# Patient Record
Sex: MALE | Race: WHITE | NOT HISPANIC OR LATINO
[De-identification: names, ages, dates, MRNs, and addresses within clinical notes are randomized per-mention and may not be internally consistent; named-entity substitution may affect disease eponyms.]

---

## 2019-06-05 PROBLEM — Z00.00 ENCOUNTER FOR PREVENTIVE HEALTH EXAMINATION: Status: ACTIVE | Noted: 2019-06-05

## 2019-06-13 ENCOUNTER — APPOINTMENT (OUTPATIENT)
Dept: SURGERY | Facility: CLINIC | Age: 79
End: 2019-06-13
Payer: MEDICARE

## 2019-06-13 VITALS
WEIGHT: 190 LBS | HEIGHT: 66 IN | BODY MASS INDEX: 30.53 KG/M2 | HEART RATE: 58 BPM | SYSTOLIC BLOOD PRESSURE: 159 MMHG | DIASTOLIC BLOOD PRESSURE: 87 MMHG

## 2019-06-13 DIAGNOSIS — Z78.9 OTHER SPECIFIED HEALTH STATUS: ICD-10-CM

## 2019-06-13 PROCEDURE — 31575 DIAGNOSTIC LARYNGOSCOPY: CPT

## 2019-06-13 PROCEDURE — 99204 OFFICE O/P NEW MOD 45 MIN: CPT

## 2019-06-13 RX ORDER — TAMSULOSIN HYDROCHLORIDE 0.4 MG/1
0.4 CAPSULE ORAL
Refills: 0 | Status: ACTIVE | COMMUNITY

## 2019-06-13 RX ORDER — LATANOPROST/PF 0.005 %
DROPS OPHTHALMIC (EYE)
Refills: 0 | Status: ACTIVE | COMMUNITY

## 2019-06-13 NOTE — HISTORY OF PRESENT ILLNESS
[de-identified] : Pt c/o left cheek mass for 6 months.   painful and bleeding.  denies increase in size,dysphagia, hoarseness or history of trauma. 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

## 2019-06-13 NOTE — PHYSICAL EXAM
[de-identified] : no palpable thyroid nodules [Nasal Endoscopy Performed] : nasal endoscopy was performed, see procedure section for findings [Laryngoscopy Performed] : laryngoscopy was performed, see procedure section for findings [de-identified] : 3 x 4 cm left hard palate lesion extending into sulcus and on to buccal mucosa with suture at lower end from recent biopsy [Midline] : located in midline position [Normal] : orientation to person, place, and time: normal [de-identified] : fiberoptic laryngoscopy shows normal vocal cord mobility bilaterally with no lesions noted

## 2019-06-13 NOTE — CONSULT LETTER
[Dear  ___] : Dear  [unfilled], [Please see my note below.] : Please see my note below. [Consult Closing:] : Thank you very much for allowing me to participate in the care of this patient.  If you have any questions, please do not hesitate to contact me. [Consult Letter:] : I had the pleasure of evaluating your patient, [unfilled]. [Sincerely,] : Sincerely, [FreeTextEntry2] : Dr. Jack Lundy, Dr. Jaime Wen [DrVu  ___] : Dr. MISHRA [FreeTextEntry3] : Selvin Christianson MD, FACS\par System Director, Endocrine Surgery\par [facility]\par

## 2019-06-16 ENCOUNTER — FORM ENCOUNTER (OUTPATIENT)
Age: 79
End: 2019-06-16

## 2019-06-17 ENCOUNTER — APPOINTMENT (OUTPATIENT)
Dept: NUCLEAR MEDICINE | Facility: IMAGING CENTER | Age: 79
End: 2019-06-17
Payer: MEDICARE

## 2019-06-17 ENCOUNTER — OUTPATIENT (OUTPATIENT)
Dept: OUTPATIENT SERVICES | Facility: HOSPITAL | Age: 79
LOS: 1 days | End: 2019-06-17
Payer: MEDICARE

## 2019-06-17 ENCOUNTER — APPOINTMENT (OUTPATIENT)
Dept: CT IMAGING | Facility: IMAGING CENTER | Age: 79
End: 2019-06-17
Payer: MEDICARE

## 2019-06-17 ENCOUNTER — TRANSCRIPTION ENCOUNTER (OUTPATIENT)
Age: 79
End: 2019-06-17

## 2019-06-17 DIAGNOSIS — C03.9 MALIGNANT NEOPLASM OF GUM, UNSPECIFIED: ICD-10-CM

## 2019-06-17 PROCEDURE — 78815 PET IMAGE W/CT SKULL-THIGH: CPT

## 2019-06-17 PROCEDURE — 78815 PET IMAGE W/CT SKULL-THIGH: CPT | Mod: 26,PI

## 2019-06-17 PROCEDURE — 70491 CT SOFT TISSUE NECK W/DYE: CPT | Mod: 26

## 2019-06-17 PROCEDURE — 82565 ASSAY OF CREATININE: CPT

## 2019-06-17 PROCEDURE — 70491 CT SOFT TISSUE NECK W/DYE: CPT

## 2019-06-17 PROCEDURE — A9552: CPT

## 2019-06-18 ENCOUNTER — OTHER (OUTPATIENT)
Age: 79
End: 2019-06-18

## 2019-06-20 ENCOUNTER — FORM ENCOUNTER (OUTPATIENT)
Age: 79
End: 2019-06-20

## 2019-06-21 ENCOUNTER — APPOINTMENT (OUTPATIENT)
Dept: ULTRASOUND IMAGING | Facility: IMAGING CENTER | Age: 79
End: 2019-06-21
Payer: MEDICARE

## 2019-06-21 ENCOUNTER — RESULT REVIEW (OUTPATIENT)
Age: 79
End: 2019-06-21

## 2019-06-21 ENCOUNTER — OUTPATIENT (OUTPATIENT)
Dept: OUTPATIENT SERVICES | Facility: HOSPITAL | Age: 79
LOS: 1 days | End: 2019-06-21
Payer: MEDICARE

## 2019-06-21 DIAGNOSIS — C03.9 MALIGNANT NEOPLASM OF GUM, UNSPECIFIED: ICD-10-CM

## 2019-06-21 PROCEDURE — 76942 ECHO GUIDE FOR BIOPSY: CPT

## 2019-06-21 PROCEDURE — 87205 SMEAR GRAM STAIN: CPT

## 2019-06-21 PROCEDURE — 88305 TISSUE EXAM BY PATHOLOGIST: CPT | Mod: 26

## 2019-06-21 PROCEDURE — 76942 ECHO GUIDE FOR BIOPSY: CPT | Mod: 26,59

## 2019-06-21 PROCEDURE — 88184 FLOWCYTOMETRY/ TC 1 MARKER: CPT

## 2019-06-21 PROCEDURE — 88185 FLOWCYTOMETRY/TC ADD-ON: CPT

## 2019-06-21 PROCEDURE — 38505 NEEDLE BIOPSY LYMPH NODES: CPT

## 2019-06-21 PROCEDURE — 88173 CYTOPATH EVAL FNA REPORT: CPT | Mod: 26

## 2019-06-21 PROCEDURE — 88172 CYTP DX EVAL FNA 1ST EA SITE: CPT

## 2019-06-21 PROCEDURE — 10005 FNA BX W/US GDN 1ST LES: CPT | Mod: 59

## 2019-06-21 PROCEDURE — 20206 BIOPSY MUSCLE PERQ NEEDLE: CPT

## 2019-06-21 PROCEDURE — 10005 FNA BX W/US GDN 1ST LES: CPT

## 2019-06-21 PROCEDURE — 10006 FNA BX W/US GDN EA ADDL: CPT

## 2019-06-21 PROCEDURE — 88173 CYTOPATH EVAL FNA REPORT: CPT

## 2019-06-21 PROCEDURE — 88188 FLOWCYTOMETRY/READ 9-15: CPT

## 2019-06-21 PROCEDURE — 88305 TISSUE EXAM BY PATHOLOGIST: CPT

## 2019-06-25 ENCOUNTER — OTHER (OUTPATIENT)
Age: 79
End: 2019-06-25

## 2019-06-25 LAB
NON-GYNECOLOGICAL CYTOLOGY STUDY: SIGNIFICANT CHANGE UP
NON-GYNECOLOGICAL CYTOLOGY STUDY: SIGNIFICANT CHANGE UP

## 2019-06-26 LAB — TM INTERPRETATION: SIGNIFICANT CHANGE UP

## 2019-06-28 LAB — NON-GYNECOLOGICAL CYTOLOGY STUDY: SIGNIFICANT CHANGE UP

## 2019-07-01 ENCOUNTER — APPOINTMENT (OUTPATIENT)
Dept: OTOLARYNGOLOGY | Facility: CLINIC | Age: 79
End: 2019-07-01
Payer: MEDICARE

## 2019-07-01 ENCOUNTER — FORM ENCOUNTER (OUTPATIENT)
Age: 79
End: 2019-07-01

## 2019-07-01 VITALS
HEART RATE: 66 BPM | HEIGHT: 66 IN | BODY MASS INDEX: 30.53 KG/M2 | SYSTOLIC BLOOD PRESSURE: 169 MMHG | WEIGHT: 190 LBS | DIASTOLIC BLOOD PRESSURE: 92 MMHG

## 2019-07-01 PROCEDURE — 31575 DIAGNOSTIC LARYNGOSCOPY: CPT

## 2019-07-01 PROCEDURE — 99205 OFFICE O/P NEW HI 60 MIN: CPT | Mod: 25

## 2019-07-01 NOTE — PROCEDURE
[FreeTextEntry6] : N [Gag Reflex] : gag reflex preventing mirror examination [None] : none [Flexible Endoscope] : examined with the flexible endoscope [Serial Number: ___] : Serial Number: [unfilled] [de-identified] : No lesions in the NPx, OPx, HPx or larynx.  VC are mobile, airway patent.\par

## 2019-07-01 NOTE — PHYSICAL EXAM
[de-identified] : L. neck level I LAD, somewhat firm, approx. 1 - 1.5 cm, no obviously significant LAD in the right neck. [Laryngoscopy Performed] : laryngoscopy was performed, see procedure section for findings [FreeTextEntry1] : Erosive mass involving the L. maxillary alveolus, palate and L. buccal mucosa.  No other lesions. [Normal] : no rashes [de-identified] : No other LAD.

## 2019-07-01 NOTE — HISTORY OF PRESENT ILLNESS
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   Pt noticed the mass about 6 months ago.  Pt had CT and PET/CT at [facility].   Pt c/o pain when eating.  Pt wears full dentures.  Pt smoked 1ppd for 40 years and quit 25 years ago.  He denies any alcohol use.  He denies any weight loss.  He underwent US FNA of right neck nodes which were all reported negative.  He denies any recent visual issues of late.

## 2019-07-01 NOTE — CONSULT LETTER
[Dear  ___] : Dear  [unfilled], [Consult Letter:] : I had the pleasure of evaluating your patient, [unfilled]. [Please see my note below.] : Please see my note below. [Consult Closing:] : Thank you very much for allowing me to participate in the care of this patient.  If you have any questions, please do not hesitate to contact me. [Sincerely,] : Sincerely, [FreeTextEntry2] : Dr. Jack Lundy\par [address], \[place] [address] [FreeTextEntry3] : Dev\par \par Ho Roca MD FACS\par Division of Head and Neck Surgery\par Department of Otolaryngology \par [facility]\par \par  of Otolaryngology\par Assistant Professor of Surgery\par [facility] [employer] [facility] [DrVu  ___] : Dr. MISHRA

## 2019-07-01 NOTE — DATA REVIEWED
[de-identified] : CT Neck and PET/CT with L. maxillary alveolus with bony erosive change and involving the maxillary sinus antrum.  There appear to be pathologic LAD bilaterally.  No evidence of distant disease. [de-identified] : Pathology from primary site with well to moderately differentiated SCCa.  Pathology from FNA of three right neck nodes all reported negative for malignancy.

## 2019-07-02 ENCOUNTER — APPOINTMENT (OUTPATIENT)
Dept: MRI IMAGING | Facility: IMAGING CENTER | Age: 79
End: 2019-07-02
Payer: MEDICARE

## 2019-07-02 ENCOUNTER — OUTPATIENT (OUTPATIENT)
Dept: OUTPATIENT SERVICES | Facility: HOSPITAL | Age: 79
LOS: 1 days | End: 2019-07-02
Payer: MEDICARE

## 2019-07-02 DIAGNOSIS — C03.0 MALIGNANT NEOPLASM OF UPPER GUM: ICD-10-CM

## 2019-07-02 PROCEDURE — A9585: CPT

## 2019-07-02 PROCEDURE — 70543 MRI ORBT/FAC/NCK W/O &W/DYE: CPT | Mod: 26

## 2019-07-02 PROCEDURE — 70543 MRI ORBT/FAC/NCK W/O &W/DYE: CPT

## 2019-07-09 ENCOUNTER — APPOINTMENT (OUTPATIENT)
Dept: OTOLARYNGOLOGY | Facility: CLINIC | Age: 79
End: 2019-07-09
Payer: MEDICARE

## 2019-07-09 ENCOUNTER — RESULT REVIEW (OUTPATIENT)
Age: 79
End: 2019-07-09

## 2019-07-09 ENCOUNTER — APPOINTMENT (OUTPATIENT)
Dept: PLASTIC SURGERY | Facility: CLINIC | Age: 79
End: 2019-07-09

## 2019-07-09 VITALS
HEART RATE: 64 BPM | HEIGHT: 66 IN | SYSTOLIC BLOOD PRESSURE: 150 MMHG | DIASTOLIC BLOOD PRESSURE: 80 MMHG | WEIGHT: 190 LBS | BODY MASS INDEX: 30.53 KG/M2

## 2019-07-09 PROCEDURE — 31231 NASAL ENDOSCOPY DX: CPT

## 2019-07-09 PROCEDURE — 99215 OFFICE O/P EST HI 40 MIN: CPT | Mod: 25

## 2019-07-09 NOTE — PHYSICAL EXAM
[Normal] : no rashes [de-identified] : L. neck level I LAD, somewhat firm, approx. 1 - 1.5 cm, no obviously significant LAD in the right neck. [FreeTextEntry1] : Erosive mass involving the L. maxillary alveolus, palate and L. buccal mucosa.  No other lesions. [de-identified] : No other LAD.

## 2019-07-09 NOTE — DATA REVIEWED
[de-identified] : MRI with large bulky mass involving the left maxillary alveolus, floor of the maxillary sinus with involvement of the posterior and lateral walls with extension into the buccal space.  The tumor extends into the retromaxillary fat.  There appears to be significant soft tissue extension within the ITF with involvement of the lateral pterygoid musculature and medial aspect of the masseter muscle.  There is also nonspecific enhancement of the L. NPx, soft palate and left tonsil.

## 2019-07-09 NOTE — CONSULT LETTER
[Courtesy Letter:] : I had the pleasure of seeing your patient, [unfilled], in my office today. [Please see my note below.] : Please see my note below. [Dear  ___] : Dear  [unfilled], [Consult Closing:] : Thank you very much for allowing me to participate in the care of this patient.  If you have any questions, please do not hesitate to contact me. [Sincerely,] : Sincerely, [FreeTextEntry2] : Dr. Jack Lundy\par [address], \[place] [address]  [FreeTextEntry3] : Dev\par \par Ho Roca MD FACS\par Division of Head and Neck Surgery\par Department of Otolaryngology \par [facility]\par \par  of Otolaryngology\par Assistant Professor of Surgery\par [facility] [employer] at [facility]\par

## 2019-07-09 NOTE — PROCEDURE
[Flexible Endoscope] : examined with the flexible endoscope [Mass] : a mass [Serial Number: ___] : Serial Number: [unfilled] [Normal] : the middle meatus had no abnormalities [FreeTextEntry6] : There is no involvement in the NPx and the soft palate appears clear as well.

## 2019-07-09 NOTE — HISTORY OF PRESENT ILLNESS
[de-identified] : Pt is here to discuss plan of care for L maxilla SCCa.  Pt c/o pain when eating. Pt wears full dentures. He denies any weight loss. He underwent US FNA of right neck nodes which were all reported negative. He denies any recent visual issues of late.  He also denies any dyspnea or otalgia.\par

## 2019-07-11 ENCOUNTER — OUTPATIENT (OUTPATIENT)
Dept: OUTPATIENT SERVICES | Facility: HOSPITAL | Age: 79
LOS: 1 days | Discharge: HOME | End: 2019-07-11
Payer: MEDICARE

## 2019-07-11 ENCOUNTER — OTHER (OUTPATIENT)
Age: 79
End: 2019-07-11

## 2019-07-11 DIAGNOSIS — S02.672A: ICD-10-CM

## 2019-07-12 ENCOUNTER — LABORATORY RESULT (OUTPATIENT)
Age: 79
End: 2019-07-12

## 2019-07-12 ENCOUNTER — RESULT REVIEW (OUTPATIENT)
Age: 79
End: 2019-07-12

## 2019-07-12 ENCOUNTER — APPOINTMENT (OUTPATIENT)
Dept: HEMATOLOGY ONCOLOGY | Facility: CLINIC | Age: 79
End: 2019-07-12
Payer: MEDICARE

## 2019-07-12 VITALS
HEART RATE: 69 BPM | HEIGHT: 65.75 IN | DIASTOLIC BLOOD PRESSURE: 83 MMHG | SYSTOLIC BLOOD PRESSURE: 149 MMHG | BODY MASS INDEX: 30.69 KG/M2 | TEMPERATURE: 98.4 F | WEIGHT: 188.71 LBS | RESPIRATION RATE: 16 BRPM | OXYGEN SATURATION: 95 %

## 2019-07-12 DIAGNOSIS — R22.0 LOCALIZED SWELLING, MASS AND LUMP, HEAD: ICD-10-CM

## 2019-07-12 DIAGNOSIS — N40.0 BENIGN PROSTATIC HYPERPLASIA WITHOUT LOWER URINARY TRACT SYMPMS: ICD-10-CM

## 2019-07-12 DIAGNOSIS — Z86.69 PERSONAL HISTORY OF OTHER DISEASES OF THE NERVOUS SYSTEM AND SENSE ORGANS: ICD-10-CM

## 2019-07-12 DIAGNOSIS — Z80.6 FAMILY HISTORY OF LEUKEMIA: ICD-10-CM

## 2019-07-12 DIAGNOSIS — Z80.42 FAMILY HISTORY OF MALIGNANT NEOPLASM OF PROSTATE: ICD-10-CM

## 2019-07-12 DIAGNOSIS — Z87.891 PERSONAL HISTORY OF NICOTINE DEPENDENCE: ICD-10-CM

## 2019-07-12 LAB
BASOPHILS # BLD AUTO: 0 K/UL — SIGNIFICANT CHANGE UP (ref 0–0.2)
BASOPHILS NFR BLD AUTO: 0.2 % — SIGNIFICANT CHANGE UP (ref 0–2)
EOSINOPHIL # BLD AUTO: 0.3 K/UL — SIGNIFICANT CHANGE UP (ref 0–0.5)
EOSINOPHIL NFR BLD AUTO: 3.3 % — SIGNIFICANT CHANGE UP (ref 0–6)
HCT VFR BLD CALC: 44.7 % — SIGNIFICANT CHANGE UP (ref 39–50)
HGB BLD-MCNC: 14.3 G/DL — SIGNIFICANT CHANGE UP (ref 13–17)
LYMPHOCYTES # BLD AUTO: 1.4 K/UL — SIGNIFICANT CHANGE UP (ref 1–3.3)
LYMPHOCYTES # BLD AUTO: 17.7 % — SIGNIFICANT CHANGE UP (ref 13–44)
MCHC RBC-ENTMCNC: 29.1 PG — SIGNIFICANT CHANGE UP (ref 27–34)
MCHC RBC-ENTMCNC: 32.1 G/DL — SIGNIFICANT CHANGE UP (ref 32–36)
MCV RBC AUTO: 90.8 FL — SIGNIFICANT CHANGE UP (ref 80–100)
MONOCYTES # BLD AUTO: 1.1 K/UL — HIGH (ref 0–0.9)
MONOCYTES NFR BLD AUTO: 13.8 % — SIGNIFICANT CHANGE UP (ref 2–14)
NEUTROPHILS # BLD AUTO: 5 K/UL — SIGNIFICANT CHANGE UP (ref 1.8–7.4)
NEUTROPHILS NFR BLD AUTO: 65 % — SIGNIFICANT CHANGE UP (ref 43–77)
PLATELET # BLD AUTO: 476 K/UL — HIGH (ref 150–400)
RBC # BLD: 4.92 M/UL — SIGNIFICANT CHANGE UP (ref 4.2–5.8)
RBC # FLD: 13.1 % — SIGNIFICANT CHANGE UP (ref 10.3–14.5)
WBC # BLD: 7.8 K/UL — SIGNIFICANT CHANGE UP (ref 3.8–10.5)
WBC # FLD AUTO: 7.8 K/UL — SIGNIFICANT CHANGE UP (ref 3.8–10.5)

## 2019-07-12 PROCEDURE — 99205 OFFICE O/P NEW HI 60 MIN: CPT

## 2019-07-12 NOTE — PHYSICAL EXAM
[Fully active, able to carry on all pre-disease performance without restriction] : Status 0 - Fully active, able to carry on all pre-disease performance without restriction [Obese] : obese [Normal] : affect appropriate [de-identified] : erosive mass in the left maxillary alveolus, palate and buccal mucosa [de-identified] : left neck node 1cm size [de-identified] : ventral hernia, liver edge palpable [de-identified] : see neck otherwise normal

## 2019-07-12 NOTE — HISTORY OF PRESENT ILLNESS
[Disease: _____________________] : Disease: [unfilled] [T: ___] : T[unfilled] [N: ___] : N[unfilled] [M: ___] : M[unfilled] [AJCC Stage: ____] : AJCC Stage: [unfilled] [de-identified] : Mr. 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 As the bump started to grow, he sought medical help and had the work up done below. \par \par PET-CT 6/17/19\par 1. Hypermetabolic enhancing left upper gingival mass corresponds to patient's known squamous cell carcinoma. Please see report of contrast-enhanced CT of same day for further description of the primary lesion. \par \par 2. Nonspecific, asymmetric hypermetabolism in left nasopharynx and left greater than right base of tongue. Please correlate with direct visualization. \par \par 3. Hypermetabolic bilateral level IB and IIA cervical lymph nodes are compatible with metastatic disease. Ultrasound-guided percutaneous needle biopsy may be obtained for confirmation. \par \par 4. Non FDG-avid right upper lobe nodular opacity is nonspecific. Correlate clinically for infection. A dedicated CT of chest is recommended in one month for further evaluation. \par \par 5. Markedly enlarged prostate gland without associated hypermetabolism.\par \par CT neck 6/17/19\par Large plaque like enhancement abnormality centered along the left maxillary gingivobuccal sulcus extending into the left maxillary sinus and toward the left infraorbital canal, and left greater palatine foramen as described. Contrast-enhanced MR imaging of the skull base can be done to evaluate for possible perineural spread of disease. \par \par Contiguous versus possible synchronous additional malignant disease seen along the left nasopharynx, extending to the uvula. Left asymmetric lingual tonsillar enhancement extending to the vallecula which may represent tonsillar hypertrophy though additional neoplastic disease is not excluded. \par \par Bilateral pathologic level I, left II, bilateral III lymphadenopathy. \par \par MRI brain 7/2/19\par A large bulky mass with associated bony destruction is again noted involving the left maxillary alveolus, floor of the left maxillary sinus, and lower aspect of the posterior lateral wall of the left maxillary sinus, with extension to the left buccal space across the gingival buccal sulcus. The tumor extends into the lower retromaxillary fat. Tumor abuts and may involve the base of the pterygoid plates and greater-lesser palatine foramina, however the pterygopalatine fossa, infraorbital canal, Meckel's caves, and cavernous sinuses appear grossly spared. A component of the tumor may spread submucosally underneath the left hard palate. \par \par Nonspecific asymmetric enhancement involving the left nasopharynx, left soft palate, uvula, and left tonsil appears stable compared to the prior CT. Correlate with direct visualization findings to exclude mucosal or submucosal spread of tumor versus lymphoid hypertrophy. \par \par Heterogeneous bilateral level 1 and level 2 lymph nodes are again noted suspicious for pathologic lymphadenopathy, stable in size compared to the CT examination. \par \par Pathology 7/9/19- invasive squamous cell carcinoma, well to moderately differentiated\par \par FNA cervical node right 6/25/19- negative for malignancy on level 1B, 2 and 3 [de-identified] : SCC [de-identified] : Patient lost around 12lb due to anorexia over the past couple months. Some pain on left maxilla, minimal pain, does not take any pain medication, sharp, does not radiate. Denies SOB, chest pain, palpitation. Some constipation. Denies epistaxis. Chronic hearing problems- hearing aids- due to otosclerosis. Edentulous. Denies neuropathy, urinary issues, leg edema, headaches. All other ROS negative.

## 2019-07-12 NOTE — REASON FOR VISIT
[Initial Consultation] : an initial consultation [Spouse] : spouse [FreeTextEntry2] : maxillary sinus cancer

## 2019-07-12 NOTE — ASSESSMENT
[FreeTextEntry1] : Mr. 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 \par \par I had a lengthy discussion with the patient about his diagnosis, prognosis, stage and treatment options. He had time to answer questions and all the questions were answered to his satisfaction. \par \par Due to the extent of the disease, case was discussed at tumor board and consensus was to start induction chemo/immunotherapy to assess for response of disease prior to considering surgery and/or radiation vs systemic palliation only. \par \par Labs on his way out. Return to clinic in one week.\par \par \par Cassia Campbell MD\par , [facility]\par [facility]\par [address]\[place], [address]\par Tel: [phone]\par Fax: [phone]\par \par

## 2019-07-19 ENCOUNTER — APPOINTMENT (OUTPATIENT)
Dept: HEMATOLOGY ONCOLOGY | Facility: CLINIC | Age: 79
End: 2019-07-19
Payer: MEDICARE

## 2019-07-19 ENCOUNTER — APPOINTMENT (OUTPATIENT)
Dept: INFUSION THERAPY | Facility: HOSPITAL | Age: 79
End: 2019-07-19

## 2019-07-19 VITALS
RESPIRATION RATE: 16 BRPM | TEMPERATURE: 98.5 F | BODY MASS INDEX: 30.26 KG/M2 | SYSTOLIC BLOOD PRESSURE: 171 MMHG | WEIGHT: 186.07 LBS | DIASTOLIC BLOOD PRESSURE: 81 MMHG | OXYGEN SATURATION: 97 % | HEART RATE: 60 BPM

## 2019-07-19 PROCEDURE — 99214 OFFICE O/P EST MOD 30 MIN: CPT

## 2019-07-20 NOTE — PHYSICAL EXAM
[Fully active, able to carry on all pre-disease performance without restriction] : Status 0 - Fully active, able to carry on all pre-disease performance without restriction [Obese] : obese [Normal] : affect appropriate [de-identified] : erosive mass in the left maxillary alveolus, palate and buccal mucosa [de-identified] : left neck node 1cm size [de-identified] : ventral hernia, liver edge palpable [de-identified] : see neck otherwise normal

## 2019-07-20 NOTE — HISTORY OF PRESENT ILLNESS
[Disease: _____________________] : Disease: [unfilled] [T: ___] : T[unfilled] [N: ___] : N[unfilled] [M: ___] : M[unfilled] [AJCC Stage: ____] : AJCC Stage: [unfilled] [de-identified] : Mr. 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 As the bump started to grow, he sought medical help and had the work up done below. \par \par PET-CT 6/17/19\par 1. Hypermetabolic enhancing left upper gingival mass corresponds to patient's known squamous cell carcinoma. Please see report of contrast-enhanced CT of same day for further description of the primary lesion. \par \par 2. Nonspecific, asymmetric hypermetabolism in left nasopharynx and left greater than right base of tongue. Please correlate with direct visualization. \par \par 3. Hypermetabolic bilateral level IB and IIA cervical lymph nodes are compatible with metastatic disease. Ultrasound-guided percutaneous needle biopsy may be obtained for confirmation. \par \par 4. Non FDG-avid right upper lobe nodular opacity is nonspecific. Correlate clinically for infection. A dedicated CT of chest is recommended in one month for further evaluation. \par \par 5. Markedly enlarged prostate gland without associated hypermetabolism.\par \par CT neck 6/17/19\par Large plaque like enhancement abnormality centered along the left maxillary gingivobuccal sulcus extending into the left maxillary sinus and toward the left infraorbital canal, and left greater palatine foramen as described. Contrast-enhanced MR imaging of the skull base can be done to evaluate for possible perineural spread of disease. \par \par Contiguous versus possible synchronous additional malignant disease seen along the left nasopharynx, extending to the uvula. Left asymmetric lingual tonsillar enhancement extending to the vallecula which may represent tonsillar hypertrophy though additional neoplastic disease is not excluded. \par \par Bilateral pathologic level I, left II, bilateral III lymphadenopathy. \par \par MRI brain 7/2/19\par A large bulky mass with associated bony destruction is again noted involving the left maxillary alveolus, floor of the left maxillary sinus, and lower aspect of the posterior lateral wall of the left maxillary sinus, with extension to the left buccal space across the gingival buccal sulcus. The tumor extends into the lower retromaxillary fat. Tumor abuts and may involve the base of the pterygoid plates and greater-lesser palatine foramina, however the pterygopalatine fossa, infraorbital canal, Meckel's caves, and cavernous sinuses appear grossly spared. A component of the tumor may spread submucosally underneath the left hard palate. \par \par Nonspecific asymmetric enhancement involving the left nasopharynx, left soft palate, uvula, and left tonsil appears stable compared to the prior CT. Correlate with direct visualization findings to exclude mucosal or submucosal spread of tumor versus lymphoid hypertrophy. \par \par Heterogeneous bilateral level 1 and level 2 lymph nodes are again noted suspicious for pathologic lymphadenopathy, stable in size compared to the CT examination. 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 Bromberg is doing well. Some pain on left maxilla, minimal pain, does not take any pain medication, sharp, does not radiate. Denies SOB, chest pain, palpitation. Some constipation. Denies epistaxis. Chronic hearing problems- hearing aids- due to otosclerosis. Edentulous. Denies neuropathy, urinary issues, leg edema, headaches. All other ROS negative.

## 2019-07-20 NOTE — ASSESSMENT
[FreeTextEntry1] : Mr. 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 \par \par I had a lengthy discussion with the patient about his diagnosis, prognosis, stage and treatment options. He had time to answer questions and all the questions were answered to his satisfaction. \par \par Due to the extent of the disease, case was discussed at tumor board and consensus was to start induction chemo/immunotherapy to assess for response of disease prior to considering surgery and/or radiation vs systemic palliation only. \par \par We discussed with the patient about immunotherapy, explaining the mechanism of actions of immunotherapy agents, potential side effects which include but are not limited to auto-immune processes, such as pneumonitis, skin rashes, colitis, hepatitis, myositis, endocrine effects.\par \par Today I met with our patient regarding treatment with systemic therapy with carbo/taxol and pembro.  During the visit, we explained in detail the rationale for both this treatment as well as other standard treatment and palliative options if any available.  We presented and discussed in detail the potential side effects of the medication(s) and procedures. We had given our patient the informed consent form for their review, and all questions about the treatment and potential side effects were discussed and questions answered by me to the patient's apparent satisfaction.\par \par After weighing the risks and benefits, our patient expressed the agreement to receive therapy as indicated above.  The patient signed and dated a copy of the informed consent with a witness as indicated. We gave our patient a copy of the signed informed consent. We placed a copy of the consent form in the [facility] for our record keeping. \par \par \par Tentatively cycle #1 is 7/23/19\par \par Cassia Campbell MD\par , [facility]\par [facility]\par [address]\[place], [address]\par Tel: [phone]\par Fax: [phone]\par \par

## 2019-07-23 ENCOUNTER — RESULT REVIEW (OUTPATIENT)
Age: 79
End: 2019-07-23

## 2019-07-23 ENCOUNTER — OTHER (OUTPATIENT)
Age: 79
End: 2019-07-23

## 2019-07-23 ENCOUNTER — APPOINTMENT (OUTPATIENT)
Dept: INFUSION THERAPY | Facility: HOSPITAL | Age: 79
End: 2019-07-23

## 2019-07-23 LAB
BASOPHILS # BLD AUTO: 0 K/UL — SIGNIFICANT CHANGE UP (ref 0–0.2)
BASOPHILS NFR BLD AUTO: 0.4 % — SIGNIFICANT CHANGE UP (ref 0–2)
BUN SERPL-MCNC: 15 MG/DL — SIGNIFICANT CHANGE UP (ref 7–23)
CA-I BLDA-SCNC: 1.21 MMOL/L — SIGNIFICANT CHANGE UP (ref 1.12–1.3)
CHLORIDE SERPL-SCNC: 103 MMOL/L — SIGNIFICANT CHANGE UP (ref 96–108)
CO2 SERPL-SCNC: 26 MMOL/L — SIGNIFICANT CHANGE UP (ref 22–31)
CREAT SERPL-MCNC: 1.2 MG/DL — SIGNIFICANT CHANGE UP (ref 0.5–1.3)
EOSINOPHIL # BLD AUTO: 0.3 K/UL — SIGNIFICANT CHANGE UP (ref 0–0.5)
EOSINOPHIL NFR BLD AUTO: 3.4 % — SIGNIFICANT CHANGE UP (ref 0–6)
GLUCOSE SERPL-MCNC: 109 MG/DL — HIGH (ref 70–99)
HCT VFR BLD CALC: 42.1 % — SIGNIFICANT CHANGE UP (ref 39–50)
HGB BLD-MCNC: 14.2 G/DL — SIGNIFICANT CHANGE UP (ref 13–17)
LYMPHOCYTES # BLD AUTO: 1.6 K/UL — SIGNIFICANT CHANGE UP (ref 1–3.3)
LYMPHOCYTES # BLD AUTO: 15.8 % — SIGNIFICANT CHANGE UP (ref 13–44)
MCHC RBC-ENTMCNC: 30.2 PG — SIGNIFICANT CHANGE UP (ref 27–34)
MCHC RBC-ENTMCNC: 33.7 G/DL — SIGNIFICANT CHANGE UP (ref 32–36)
MCV RBC AUTO: 89.5 FL — SIGNIFICANT CHANGE UP (ref 80–100)
MONOCYTES # BLD AUTO: 1.2 K/UL — HIGH (ref 0–0.9)
MONOCYTES NFR BLD AUTO: 11.5 % — SIGNIFICANT CHANGE UP (ref 2–14)
NEUTROPHILS # BLD AUTO: 6.9 K/UL — SIGNIFICANT CHANGE UP (ref 1.8–7.4)
NEUTROPHILS NFR BLD AUTO: 68.9 % — SIGNIFICANT CHANGE UP (ref 43–77)
PLATELET # BLD AUTO: 411 K/UL — HIGH (ref 150–400)
POTASSIUM SERPL-MCNC: 4.2 MMOL/L — SIGNIFICANT CHANGE UP (ref 3.5–5.3)
POTASSIUM SERPL-SCNC: 4.2 MMOL/L — SIGNIFICANT CHANGE UP (ref 3.5–5.3)
RBC # BLD: 4.7 M/UL — SIGNIFICANT CHANGE UP (ref 4.2–5.8)
RBC # FLD: 11.9 % — SIGNIFICANT CHANGE UP (ref 10.3–14.5)
SODIUM SERPL-SCNC: 139 MMOL/L — SIGNIFICANT CHANGE UP (ref 135–145)
WBC # BLD: 10 K/UL — SIGNIFICANT CHANGE UP (ref 3.8–10.5)
WBC # FLD AUTO: 10 K/UL — SIGNIFICANT CHANGE UP (ref 3.8–10.5)

## 2019-07-23 PROCEDURE — 93010 ELECTROCARDIOGRAM REPORT: CPT

## 2019-07-24 ENCOUNTER — OTHER (OUTPATIENT)
Age: 79
End: 2019-07-24

## 2019-07-24 DIAGNOSIS — R11.2 NAUSEA WITH VOMITING, UNSPECIFIED: ICD-10-CM

## 2019-07-24 DIAGNOSIS — Z51.11 ENCOUNTER FOR ANTINEOPLASTIC CHEMOTHERAPY: ICD-10-CM

## 2019-07-24 DIAGNOSIS — C76.0 MALIGNANT NEOPLASM OF HEAD, FACE AND NECK: ICD-10-CM

## 2019-07-24 DIAGNOSIS — C06.9 MALIGNANT NEOPLASM OF MOUTH, UNSPECIFIED: ICD-10-CM

## 2019-07-26 ENCOUNTER — MOBILE ON CALL (OUTPATIENT)
Age: 79
End: 2019-07-26

## 2019-07-29 ENCOUNTER — MESSAGE (OUTPATIENT)
Age: 79
End: 2019-07-29

## 2019-08-06 ENCOUNTER — OTHER (OUTPATIENT)
Age: 79
End: 2019-08-06

## 2019-08-08 ENCOUNTER — OUTPATIENT (OUTPATIENT)
Dept: OUTPATIENT SERVICES | Facility: HOSPITAL | Age: 79
LOS: 1 days | Discharge: HOME | End: 2019-08-08

## 2019-08-08 DIAGNOSIS — C76.0 MALIGNANT NEOPLASM OF HEAD, FACE AND NECK: ICD-10-CM

## 2019-08-08 DIAGNOSIS — C06.9 MALIGNANT NEOPLASM OF MOUTH, UNSPECIFIED: ICD-10-CM

## 2019-08-08 PROBLEM — C31.0 MALIGNANT NEOPLASM OF MAXILLARY SINUS: Chronic | Status: ACTIVE | Noted: 2019-07-22

## 2019-08-08 PROBLEM — H80.90 UNSPECIFIED OTOSCLEROSIS, UNSPECIFIED EAR: Chronic | Status: ACTIVE | Noted: 2019-07-22

## 2019-08-08 PROBLEM — R63.0 ANOREXIA: Chronic | Status: ACTIVE | Noted: 2019-07-22

## 2019-08-12 ENCOUNTER — APPOINTMENT (OUTPATIENT)
Dept: HEMATOLOGY ONCOLOGY | Facility: CLINIC | Age: 79
End: 2019-08-12

## 2019-08-12 ENCOUNTER — RESULT REVIEW (OUTPATIENT)
Age: 79
End: 2019-08-12

## 2019-08-12 ENCOUNTER — LABORATORY RESULT (OUTPATIENT)
Age: 79
End: 2019-08-12

## 2019-08-12 LAB
ALBUMIN SERPL ELPH-MCNC: 4.4 G/DL
ALP BLD-CCNC: 72 U/L
ALT SERPL-CCNC: 23 U/L
ANION GAP SERPL CALC-SCNC: 14 MMOL/L
AST SERPL-CCNC: 20 U/L
BASOPHILS # BLD AUTO: 0 K/UL — SIGNIFICANT CHANGE UP (ref 0–0.2)
BASOPHILS NFR BLD AUTO: 0.6 % — SIGNIFICANT CHANGE UP (ref 0–2)
BILIRUB SERPL-MCNC: 0.2 MG/DL
BUN SERPL-MCNC: 17 MG/DL
CALCIUM SERPL-MCNC: 9.8 MG/DL
CHLORIDE SERPL-SCNC: 101 MMOL/L
CO2 SERPL-SCNC: 26 MMOL/L
CREAT SERPL-MCNC: 0.97 MG/DL
EOSINOPHIL # BLD AUTO: 0.1 K/UL — SIGNIFICANT CHANGE UP (ref 0–0.5)
EOSINOPHIL NFR BLD AUTO: 1.6 % — SIGNIFICANT CHANGE UP (ref 0–6)
GLUCOSE SERPL-MCNC: 133 MG/DL
HCT VFR BLD CALC: 43.6 % — SIGNIFICANT CHANGE UP (ref 39–50)
HGB BLD-MCNC: 13.9 G/DL — SIGNIFICANT CHANGE UP (ref 13–17)
LYMPHOCYTES # BLD AUTO: 1.3 K/UL — SIGNIFICANT CHANGE UP (ref 1–3.3)
LYMPHOCYTES # BLD AUTO: 19 % — SIGNIFICANT CHANGE UP (ref 13–44)
MCHC RBC-ENTMCNC: 29 PG — SIGNIFICANT CHANGE UP (ref 27–34)
MCHC RBC-ENTMCNC: 31.8 G/DL — LOW (ref 32–36)
MCV RBC AUTO: 91 FL — SIGNIFICANT CHANGE UP (ref 80–100)
MONOCYTES # BLD AUTO: 0.9 K/UL — SIGNIFICANT CHANGE UP (ref 0–0.9)
MONOCYTES NFR BLD AUTO: 13.8 % — SIGNIFICANT CHANGE UP (ref 2–14)
NEUTROPHILS # BLD AUTO: 4.4 K/UL — SIGNIFICANT CHANGE UP (ref 1.8–7.4)
NEUTROPHILS NFR BLD AUTO: 64.9 % — SIGNIFICANT CHANGE UP (ref 43–77)
PLATELET # BLD AUTO: 412 K/UL — HIGH (ref 150–400)
POTASSIUM SERPL-SCNC: 4.6 MMOL/L
PROT SERPL-MCNC: 7.7 G/DL
RBC # BLD: 4.79 M/UL — SIGNIFICANT CHANGE UP (ref 4.2–5.8)
RBC # FLD: 13.3 % — SIGNIFICANT CHANGE UP (ref 10.3–14.5)
SODIUM SERPL-SCNC: 141 MMOL/L
TSH SERPL-ACNC: 0.21 UIU/ML
WBC # BLD: 6.7 K/UL — SIGNIFICANT CHANGE UP (ref 3.8–10.5)
WBC # FLD AUTO: 6.7 K/UL — SIGNIFICANT CHANGE UP (ref 3.8–10.5)

## 2019-08-13 ENCOUNTER — APPOINTMENT (OUTPATIENT)
Dept: INFUSION THERAPY | Facility: HOSPITAL | Age: 79
End: 2019-08-13

## 2019-08-13 ENCOUNTER — APPOINTMENT (OUTPATIENT)
Dept: HEMATOLOGY ONCOLOGY | Facility: CLINIC | Age: 79
End: 2019-08-13
Payer: MEDICARE

## 2019-08-13 VITALS
OXYGEN SATURATION: 96 % | RESPIRATION RATE: 16 BRPM | BODY MASS INDEX: 30.47 KG/M2 | DIASTOLIC BLOOD PRESSURE: 84 MMHG | WEIGHT: 187.39 LBS | HEART RATE: 73 BPM | TEMPERATURE: 98.8 F | SYSTOLIC BLOOD PRESSURE: 160 MMHG

## 2019-08-13 DIAGNOSIS — Z87.898 PERSONAL HISTORY OF OTHER SPECIFIED CONDITIONS: ICD-10-CM

## 2019-08-13 DIAGNOSIS — R79.89 OTHER SPECIFIED ABNORMAL FINDINGS OF BLOOD CHEMISTRY: ICD-10-CM

## 2019-08-13 PROCEDURE — 99214 OFFICE O/P EST MOD 30 MIN: CPT

## 2019-08-13 RX ORDER — PROCHLORPERAZINE MALEATE 10 MG/1
10 TABLET ORAL
Qty: 30 | Refills: 1 | Status: DISCONTINUED | COMMUNITY
Start: 2019-07-20 | End: 2019-08-13

## 2019-08-13 RX ORDER — LEVOTHYROXINE SODIUM 0.1 MG/1
100 TABLET ORAL DAILY
Qty: 30 | Refills: 3 | Status: DISCONTINUED | COMMUNITY
Start: 2019-07-19 | End: 2019-08-13

## 2019-08-13 NOTE — ASSESSMENT
[FreeTextEntry1] : Mr. GORDON BROMBERG is a pleasant 79 year old man who comes here today prior to cycle #2 of pembro/carbo/taxol to treat  his diagnosis of maxillary sinus carcinoma. Significant decrease in the size of tumor after 1 cycle of therapy. \par \par Renewed labs- proceed with cycle #2 today, 8/13/19. \par \par Hypothyroidism- checked TSH- decrease levothyroxine to 75 mcg/day. \par \par Hiccups- refilled Reglan if that happens again. \par \par Pruritus- due to pembro. Counselled. Instructed to let us know if it happens again. \par \par Return to clinic in 3 weeks. \par \par Cassia Campbell MD\par , [facility]\par [facility]\par [address]\[place], [address]\par Tel: [phone]\par Fax: [phone]\par \par

## 2019-08-13 NOTE — HISTORY OF PRESENT ILLNESS
[Disease: _____________________] : Disease: [unfilled] [T: ___] : T[unfilled] [N: ___] : N[unfilled] [M: ___] : M[unfilled] [AJCC Stage: ____] : AJCC Stage: [unfilled] [de-identified] : Mr. 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 As the bump started to grow, he sought medical help and had the work up done below. \par \par PET-CT 6/17/19\par 1. Hypermetabolic enhancing left upper gingival mass corresponds to patient's known squamous cell carcinoma. Please see report of contrast-enhanced CT of same day for further description of the primary lesion. \par \par 2. Nonspecific, asymmetric hypermetabolism in left nasopharynx and left greater than right base of tongue. Please correlate with direct visualization. \par \par 3. Hypermetabolic bilateral level IB and IIA cervical lymph nodes are compatible with metastatic disease. Ultrasound-guided percutaneous needle biopsy may be obtained for confirmation. \par \par 4. Non FDG-avid right upper lobe nodular opacity is nonspecific. Correlate clinically for infection. A dedicated CT of chest is recommended in one month for further evaluation. \par \par 5. Markedly enlarged prostate gland without associated hypermetabolism.\par \par CT neck 6/17/19\par Large plaque like enhancement abnormality centered along the left maxillary gingivobuccal sulcus extending into the left maxillary sinus and toward the left infraorbital canal, and left greater palatine foramen as described. Contrast-enhanced MR imaging of the skull base can be done to evaluate for possible perineural spread of disease. \par \par Contiguous versus possible synchronous additional malignant disease seen along the left nasopharynx, extending to the uvula. Left asymmetric lingual tonsillar enhancement extending to the vallecula which may represent tonsillar hypertrophy though additional neoplastic disease is not excluded. \par \par Bilateral pathologic level I, left II, bilateral III lymphadenopathy. \par \par MRI brain 7/2/19\par A large bulky mass with associated bony destruction is again noted involving the left maxillary alveolus, floor of the left maxillary sinus, and lower aspect of the posterior lateral wall of the left maxillary sinus, with extension to the left buccal space across the gingival buccal sulcus. The tumor extends into the lower retromaxillary fat. Tumor abuts and may involve the base of the pterygoid plates and greater-lesser palatine foramina, however the pterygopalatine fossa, infraorbital canal, Meckel's caves, and cavernous sinuses appear grossly spared. A component of the tumor may spread submucosally underneath the left hard palate. \par \par Nonspecific asymmetric enhancement involving the left nasopharynx, left soft palate, uvula, and left tonsil appears stable compared to the prior CT. Correlate with direct visualization findings to exclude mucosal or submucosal spread of tumor versus lymphoid hypertrophy. \par \par Heterogeneous bilateral level 1 and level 2 lymph nodes are again noted suspicious for pathologic lymphadenopathy, stable in size compared to the CT examination. 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 Bromberg experienced hiccups after first cycle of chemo for 3 days. Hiccups improved with reglan. He also had some mild itching, no rash, now resolved. Some constipation, resolved with senna/colace. No pain. Increase appetite, gained weight. Denies SOB, chest pain, palpitation. Denies epistaxis. Chronic hearing problems- hearing aids- due to otosclerosis. Edentulous. Denies neuropathy, urinary issues, leg edema, headaches. All other ROS negative.

## 2019-08-13 NOTE — PHYSICAL EXAM
[Fully active, able to carry on all pre-disease performance without restriction] : Status 0 - Fully active, able to carry on all pre-disease performance without restriction [Obese] : obese [Normal] : no peripheral adenopathy appreciated [de-identified] : erosive mass in the left maxillary alveolus is barely seen on today's examination [de-identified] : ventral hernia, liver edge palpable

## 2019-08-14 ENCOUNTER — RX CHANGE (OUTPATIENT)
Age: 79
End: 2019-08-14

## 2019-08-14 DIAGNOSIS — R11.2 NAUSEA WITH VOMITING, UNSPECIFIED: ICD-10-CM

## 2019-08-14 DIAGNOSIS — Z51.11 ENCOUNTER FOR ANTINEOPLASTIC CHEMOTHERAPY: ICD-10-CM

## 2019-08-16 ENCOUNTER — EMERGENCY (EMERGENCY)
Facility: HOSPITAL | Age: 79
LOS: 1 days | Discharge: HOME | End: 2019-08-16
Attending: EMERGENCY MEDICINE | Admitting: EMERGENCY MEDICINE
Payer: MEDICARE

## 2019-08-16 VITALS
OXYGEN SATURATION: 97 % | SYSTOLIC BLOOD PRESSURE: 176 MMHG | RESPIRATION RATE: 18 BRPM | HEART RATE: 67 BPM | DIASTOLIC BLOOD PRESSURE: 88 MMHG | TEMPERATURE: 98 F

## 2019-08-16 VITALS
HEART RATE: 83 BPM | DIASTOLIC BLOOD PRESSURE: 91 MMHG | TEMPERATURE: 98 F | RESPIRATION RATE: 16 BRPM | SYSTOLIC BLOOD PRESSURE: 173 MMHG

## 2019-08-16 LAB
ALBUMIN SERPL ELPH-MCNC: 4.6 G/DL — SIGNIFICANT CHANGE UP (ref 3.3–5)
ALP SERPL-CCNC: 64 U/L — SIGNIFICANT CHANGE UP (ref 40–120)
ALT FLD-CCNC: 24 U/L — SIGNIFICANT CHANGE UP (ref 4–41)
ANION GAP SERPL CALC-SCNC: 12 MMO/L — SIGNIFICANT CHANGE UP (ref 7–14)
APPEARANCE UR: CLEAR — SIGNIFICANT CHANGE UP
AST SERPL-CCNC: 27 U/L — SIGNIFICANT CHANGE UP (ref 4–40)
BACTERIA # UR AUTO: NEGATIVE — SIGNIFICANT CHANGE UP
BASOPHILS # BLD AUTO: 0.01 K/UL — SIGNIFICANT CHANGE UP (ref 0–0.2)
BASOPHILS NFR BLD AUTO: 0.1 % — SIGNIFICANT CHANGE UP (ref 0–2)
BILIRUB SERPL-MCNC: 0.8 MG/DL — SIGNIFICANT CHANGE UP (ref 0.2–1.2)
BILIRUB UR-MCNC: NEGATIVE — SIGNIFICANT CHANGE UP
BLOOD UR QL VISUAL: NEGATIVE — SIGNIFICANT CHANGE UP
BUN SERPL-MCNC: 21 MG/DL — SIGNIFICANT CHANGE UP (ref 7–23)
CALCIUM SERPL-MCNC: 9.7 MG/DL — SIGNIFICANT CHANGE UP (ref 8.4–10.5)
CHLORIDE SERPL-SCNC: 98 MMOL/L — SIGNIFICANT CHANGE UP (ref 98–107)
CO2 SERPL-SCNC: 26 MMOL/L — SIGNIFICANT CHANGE UP (ref 22–31)
COLOR SPEC: SIGNIFICANT CHANGE UP
CREAT SERPL-MCNC: 0.85 MG/DL — SIGNIFICANT CHANGE UP (ref 0.5–1.3)
EOSINOPHIL # BLD AUTO: 0.11 K/UL — SIGNIFICANT CHANGE UP (ref 0–0.5)
EOSINOPHIL NFR BLD AUTO: 1.5 % — SIGNIFICANT CHANGE UP (ref 0–6)
GLUCOSE SERPL-MCNC: 106 MG/DL — HIGH (ref 70–99)
GLUCOSE UR-MCNC: NEGATIVE — SIGNIFICANT CHANGE UP
HCT VFR BLD CALC: 44.7 % — SIGNIFICANT CHANGE UP (ref 39–50)
HGB BLD-MCNC: 14.2 G/DL — SIGNIFICANT CHANGE UP (ref 13–17)
HYALINE CASTS # UR AUTO: NEGATIVE — SIGNIFICANT CHANGE UP
IMM GRANULOCYTES NFR BLD AUTO: 0.4 % — SIGNIFICANT CHANGE UP (ref 0–1.5)
KETONES UR-MCNC: NEGATIVE — SIGNIFICANT CHANGE UP
LEUKOCYTE ESTERASE UR-ACNC: NEGATIVE — SIGNIFICANT CHANGE UP
LYMPHOCYTES # BLD AUTO: 0.82 K/UL — LOW (ref 1–3.3)
LYMPHOCYTES # BLD AUTO: 10.8 % — LOW (ref 13–44)
MCHC RBC-ENTMCNC: 28.6 PG — SIGNIFICANT CHANGE UP (ref 27–34)
MCHC RBC-ENTMCNC: 31.8 % — LOW (ref 32–36)
MCV RBC AUTO: 89.9 FL — SIGNIFICANT CHANGE UP (ref 80–100)
MONOCYTES # BLD AUTO: 0.38 K/UL — SIGNIFICANT CHANGE UP (ref 0–0.9)
MONOCYTES NFR BLD AUTO: 5 % — SIGNIFICANT CHANGE UP (ref 2–14)
NEUTROPHILS # BLD AUTO: 6.21 K/UL — SIGNIFICANT CHANGE UP (ref 1.8–7.4)
NEUTROPHILS NFR BLD AUTO: 82.2 % — HIGH (ref 43–77)
NITRITE UR-MCNC: NEGATIVE — SIGNIFICANT CHANGE UP
NRBC # FLD: 0 K/UL — SIGNIFICANT CHANGE UP (ref 0–0)
PH UR: 7.5 — SIGNIFICANT CHANGE UP (ref 5–8)
PLATELET # BLD AUTO: 281 K/UL — SIGNIFICANT CHANGE UP (ref 150–400)
PMV BLD: 8.5 FL — SIGNIFICANT CHANGE UP (ref 7–13)
POTASSIUM SERPL-MCNC: 4 MMOL/L — SIGNIFICANT CHANGE UP (ref 3.5–5.3)
POTASSIUM SERPL-SCNC: 4 MMOL/L — SIGNIFICANT CHANGE UP (ref 3.5–5.3)
PROT SERPL-MCNC: 8.3 G/DL — SIGNIFICANT CHANGE UP (ref 6–8.3)
PROT UR-MCNC: 30 — SIGNIFICANT CHANGE UP
RBC # BLD: 4.97 M/UL — SIGNIFICANT CHANGE UP (ref 4.2–5.8)
RBC # FLD: 13.4 % — SIGNIFICANT CHANGE UP (ref 10.3–14.5)
RBC CASTS # UR COMP ASSIST: SIGNIFICANT CHANGE UP (ref 0–?)
SODIUM SERPL-SCNC: 136 MMOL/L — SIGNIFICANT CHANGE UP (ref 135–145)
SP GR SPEC: 1.02 — SIGNIFICANT CHANGE UP (ref 1–1.04)
SQUAMOUS # UR AUTO: SIGNIFICANT CHANGE UP
UROBILINOGEN FLD QL: NORMAL — SIGNIFICANT CHANGE UP
WBC # BLD: 7.56 K/UL — SIGNIFICANT CHANGE UP (ref 3.8–10.5)
WBC # FLD AUTO: 7.56 K/UL — SIGNIFICANT CHANGE UP (ref 3.8–10.5)
WBC UR QL: SIGNIFICANT CHANGE UP (ref 0–?)

## 2019-08-16 PROCEDURE — 70450 CT HEAD/BRAIN W/O DYE: CPT | Mod: 26

## 2019-08-16 PROCEDURE — 71045 X-RAY EXAM CHEST 1 VIEW: CPT | Mod: 26

## 2019-08-16 PROCEDURE — 93010 ELECTROCARDIOGRAM REPORT: CPT

## 2019-08-16 PROCEDURE — 99284 EMERGENCY DEPT VISIT MOD MDM: CPT | Mod: 25

## 2019-08-16 RX ORDER — METOCLOPRAMIDE HCL 10 MG
10 TABLET ORAL ONCE
Refills: 0 | Status: COMPLETED | OUTPATIENT
Start: 2019-08-16 | End: 2019-08-16

## 2019-08-16 RX ORDER — SODIUM CHLORIDE 9 MG/ML
1000 INJECTION INTRAMUSCULAR; INTRAVENOUS; SUBCUTANEOUS ONCE
Refills: 0 | Status: COMPLETED | OUTPATIENT
Start: 2019-08-16 | End: 2019-08-16

## 2019-08-16 RX ORDER — ACETAMINOPHEN 500 MG
650 TABLET ORAL ONCE
Refills: 0 | Status: COMPLETED | OUTPATIENT
Start: 2019-08-16 | End: 2019-08-16

## 2019-08-16 RX ADMIN — Medication 10 MILLIGRAM(S): at 20:42

## 2019-08-16 RX ADMIN — Medication 650 MILLIGRAM(S): at 18:19

## 2019-08-16 RX ADMIN — SODIUM CHLORIDE 1000 MILLILITER(S): 9 INJECTION INTRAMUSCULAR; INTRAVENOUS; SUBCUTANEOUS at 18:20

## 2019-08-16 NOTE — ED PROVIDER NOTE - CLINICAL SUMMARY MEDICAL DECISION MAKING FREE TEXT BOX
80 y/o male s/p chemo for maxillary CA 3 days ago presents w/new onset HA and palpitations today. Denies CP, SOB, cough, dysuria. Will obtain CBC, CMP, UA, CXR, head CT and reassess

## 2019-08-16 NOTE — ED PROVIDER NOTE - NS ED ROS FT
General: No fevers, chills  Eyes:  No visual changes, eye pain or discharge.  ENMT:  No hearing changes, pain, no sore throat or runny nose, no difficulty swallowing  Cardiac: +palpitations; No chest pain or pedal edema  Respiratory:  No cough or SOB  GI:  No nausea, vomiting, diarrhea or abdominal pain.  :  No dysuria, frequency or burning.  MS:  No myalgia, muscle weakness, joint pain or back pain.  Neuro:  +HA; no weakness or LOC.  Skin:  No skin rash.   Endocrine: No history of thyroid disease or diabetes.

## 2019-08-16 NOTE — ED PROVIDER NOTE - NSFOLLOWUPINSTRUCTIONS_ED_ALL_ED_FT
1.  Your labs and head ct were reassuring.     2.  Return to care for any fevers, headaches, dizziness, chest pain, or feeling unwell.    3.  Call your oncologist tomorrow and schedule an appointment as soon as able.

## 2019-08-16 NOTE — ED ADULT TRIAGE NOTE - CHIEF COMPLAINT QUOTE
s/p chemo Tuesday, pt c/o tactile fever, palpitation, tremors starting couple hours ago. afebrile in triage

## 2019-08-16 NOTE — ED PROVIDER NOTE - OBJECTIVE STATEMENT
78 y/o male w/PMHx of glaucoma and maxillary CA s/p round 2 of chemo on 08/13/19 presents to the ED w/ complaints of a HA that started today. Pt describes the headache as a throbbing sensation that was gradual in onset. Pt reports palpitations and lightheadedness that have decreased since he arrived to the ED. Pt denies any CP, SOB, abdominal pain, N/V, or dysuria. Pt reports some constipation that also happened during his last round of chemo and he takes colace and senakot for it. Chemo regemin: keytruda, carboplatin, and paclitaxel; Onco: CHAD MillerShannan [phone]

## 2019-08-16 NOTE — ED PROVIDER NOTE - PROGRESS NOTE DETAILS
reeval: pt is still complaining of HA; will give reglan and reassess JONATHAN:  Patient reassessed.  Reports improvement of headache and dizziness.  Able to ambulate without difficulty, no ataxia.  Labs reassuring, ua neg, ct neg.  Return precautions given.  Patient to call his oncologist tomorrow.

## 2019-08-16 NOTE — ED PROVIDER NOTE - PHYSICAL EXAMINATION
CONSTITUTIONAL: Well-developed; well-nourished; in no acute distress, speaking in full sentences  SKIN: warm, dry  HEAD: Normocephalic; atraumatic  EYES: PERRL, EOMI, no conjunctival erythema  ENT: No nasal discharge; airway clear, mucous membranes moist  NECK: Supple; non tender, FROM, no meningismus, no nuchal rigidity  CARD: +S1, S2 no murmurs, gallops, or rubs. Regular rate and rhythm  RESP: No wheezes, rales or rhonchi. clear to ascultation bilaterally  ABD: soft and nontender w/ no rebound, guarding, or rigidity  EXT: moves all extremities No clubbing, cyanosis or edema.   NEURO: AAOx3, grossly unremarkable, cn ii-xii grossly intact, no dysmetria w/ normal finger/nose, strength 5/5 bilaterally, 5/5  strength, normal gait  PSYCH: Cooperative, appropriate

## 2019-08-16 NOTE — ED PROVIDER NOTE - ATTENDING CONTRIBUTION TO CARE
I have personally performed a face to face bedside history and physical examination of this patient. I have discussed the history, examination, review of systems, assessment and plan of management with the resident. I have reviewed the electronic medical record and amended it to reflect my history, review of systems, physical exam, assessment and plan.    78 y/o male w/PMHx of glaucoma and skin CA s/p round 2 of chemo on 08/13/19 presents to the ED w/ complaints of a HA that started today. Pt describes the headache as a throbbing sensation that was gradual in onset.  HA is global, non-radiating, sharp, associated with dizziness both described as room spinning and lightheadedness. Pt reports palpitations and lightheadedness that have decreased since he arrived to the ED. Pt denies any CP, SOB, abdominal pain, N/V, or dysuria. Wife believes patient felt warm, but no measured temps at home. Pt reports some constipation that also happened during his last round of chemo and he takes colace and senakot for it. Chemo regemin: keytruda, carboplatin, and paclitaxel. VSS afebrile.  Exam non-toxic appearing, no focal neuro deficit, neck supples, lungs clear, abdomen soft, well perfused extremities.  Unclear constellation of sx.  Low c/f SAH or cva.  No clear infectious source.  Will send labs, ua, head ct, ivf.  Dispo pending.

## 2019-08-18 LAB
BACTERIA UR CULT: SIGNIFICANT CHANGE UP
SPECIMEN SOURCE: SIGNIFICANT CHANGE UP

## 2019-08-19 ENCOUNTER — MESSAGE (OUTPATIENT)
Age: 79
End: 2019-08-19

## 2019-08-20 ENCOUNTER — APPOINTMENT (OUTPATIENT)
Dept: OTOLARYNGOLOGY | Facility: CLINIC | Age: 79
End: 2019-08-20
Payer: MEDICARE

## 2019-08-20 VITALS
BODY MASS INDEX: 30.41 KG/M2 | DIASTOLIC BLOOD PRESSURE: 89 MMHG | WEIGHT: 187 LBS | SYSTOLIC BLOOD PRESSURE: 139 MMHG | HEIGHT: 65.75 IN | HEART RATE: 91 BPM

## 2019-08-20 PROCEDURE — 31231 NASAL ENDOSCOPY DX: CPT

## 2019-08-20 PROCEDURE — 99214 OFFICE O/P EST MOD 30 MIN: CPT | Mod: 25

## 2019-08-20 NOTE — PHYSICAL EXAM
[de-identified] : L. neck level I feels improved, no other LAD. [FreeTextEntry1] : Significant improvement in the primary disease with great decrease in size of the lesion but possible residual disease along the gingivolabial sulcus on the left maxilla.  No other lesions. [Normal] : no rashes [de-identified] : No other LAD.

## 2019-08-20 NOTE — PROCEDURE
[None] : none [Flexible Endoscope] : examined with the flexible endoscope [Mass] : a mass [Serial Number: ___] : Serial Number: [unfilled] [Normal] : the paranasal sinuses had no abnormalities [FreeTextEntry6] : There is no involvement in the NPx and the soft palate appears clear as well. \par

## 2019-08-20 NOTE — HISTORY OF PRESENT ILLNESS
[de-identified] : Pt had 2nd cycle of chemo last week which resulted in him being hospitalized for nausea, dizziness.  Pt is better now and is due for a 3rd cycle in early September.  Pt is eating a regular diet but has a lack of appetite.  Pt is scheduled to have 4 cycles of chemo.

## 2019-08-20 NOTE — CONSULT LETTER
[Dear  ___] : Dear  [unfilled], [Courtesy Letter:] : I had the pleasure of seeing your patient, [unfilled], in my office today. [Please see my note below.] : Please see my note below. [Consult Closing:] : Thank you very much for allowing me to participate in the care of this patient.  If you have any questions, please do not hesitate to contact me. [Sincerely,] : Sincerely, [FreeTextEntry2] : Dr. Jack Lundy\par [address], \[place] [address]  [FreeTextEntry3] : Dev\par \par Ho Roca MD FACS\par Division of Head and Neck Surgery\par Department of Otolaryngology \par [facility]\par \par  of Otolaryngology\par Assistant Professor of Surgery\par [employer] [facility]

## 2019-08-22 ENCOUNTER — OTHER (OUTPATIENT)
Age: 79
End: 2019-08-22

## 2019-08-30 ENCOUNTER — RESULT REVIEW (OUTPATIENT)
Age: 79
End: 2019-08-30

## 2019-08-30 ENCOUNTER — APPOINTMENT (OUTPATIENT)
Dept: HEMATOLOGY ONCOLOGY | Facility: CLINIC | Age: 79
End: 2019-08-30

## 2019-08-30 LAB
ALBUMIN SERPL ELPH-MCNC: 4.2 G/DL — SIGNIFICANT CHANGE UP (ref 3.3–5)
ALP SERPL-CCNC: 75 U/L — SIGNIFICANT CHANGE UP (ref 40–120)
ALT FLD-CCNC: 24 U/L — SIGNIFICANT CHANGE UP (ref 10–45)
ANION GAP SERPL CALC-SCNC: 12 MMOL/L — SIGNIFICANT CHANGE UP (ref 5–17)
AST SERPL-CCNC: 25 U/L — SIGNIFICANT CHANGE UP (ref 10–40)
BILIRUB SERPL-MCNC: <0.2 MG/DL — SIGNIFICANT CHANGE UP (ref 0.2–1.2)
BUN SERPL-MCNC: 12 MG/DL — SIGNIFICANT CHANGE UP (ref 7–23)
CALCIUM SERPL-MCNC: 9.5 MG/DL — SIGNIFICANT CHANGE UP (ref 8.4–10.5)
CHLORIDE SERPL-SCNC: 101 MMOL/L — SIGNIFICANT CHANGE UP (ref 96–108)
CO2 SERPL-SCNC: 25 MMOL/L — SIGNIFICANT CHANGE UP (ref 22–31)
CREAT SERPL-MCNC: 0.95 MG/DL — SIGNIFICANT CHANGE UP (ref 0.5–1.3)
EOSINOPHIL NFR BLD AUTO: 6 % — SIGNIFICANT CHANGE UP (ref 0–6)
GLUCOSE SERPL-MCNC: 129 MG/DL — HIGH (ref 70–99)
HCT VFR BLD CALC: 42.3 % — SIGNIFICANT CHANGE UP (ref 39–50)
HGB BLD-MCNC: 15 G/DL — SIGNIFICANT CHANGE UP (ref 13–17)
LYMPHOCYTES # BLD AUTO: 35 % — SIGNIFICANT CHANGE UP (ref 13–44)
LYMPHOCYTES # BLD AUTO: SIGNIFICANT CHANGE UP K/UL (ref 1–3.3)
MCHC RBC-ENTMCNC: 32 PG — SIGNIFICANT CHANGE UP (ref 27–34)
MCHC RBC-ENTMCNC: 35.5 G/DL — SIGNIFICANT CHANGE UP (ref 32–36)
MCV RBC AUTO: 90.1 FL — SIGNIFICANT CHANGE UP (ref 80–100)
MONOCYTES NFR BLD AUTO: 14 % — SIGNIFICANT CHANGE UP (ref 2–14)
NEUTROPHILS # BLD AUTO: 1.5 K/UL — LOW (ref 1.8–7.4)
NEUTROPHILS NFR BLD AUTO: 45 % — SIGNIFICANT CHANGE UP (ref 43–77)
PLAT MORPH BLD: NORMAL — SIGNIFICANT CHANGE UP
PLATELET # BLD AUTO: 436 K/UL — HIGH (ref 150–400)
POTASSIUM SERPL-MCNC: 4.3 MMOL/L — SIGNIFICANT CHANGE UP (ref 3.5–5.3)
POTASSIUM SERPL-SCNC: 4.3 MMOL/L — SIGNIFICANT CHANGE UP (ref 3.5–5.3)
PROT SERPL-MCNC: 7.2 G/DL — SIGNIFICANT CHANGE UP (ref 6–8.3)
RBC # BLD: 4.7 M/UL — SIGNIFICANT CHANGE UP (ref 4.2–5.8)
RBC # FLD: 12.7 % — SIGNIFICANT CHANGE UP (ref 10.3–14.5)
RBC BLD AUTO: SIGNIFICANT CHANGE UP
SODIUM SERPL-SCNC: 138 MMOL/L — SIGNIFICANT CHANGE UP (ref 135–145)
WBC # BLD: 3.6 K/UL — LOW (ref 3.8–10.5)
WBC # FLD AUTO: 3.6 K/UL — LOW (ref 3.8–10.5)

## 2019-09-03 ENCOUNTER — APPOINTMENT (OUTPATIENT)
Dept: HEMATOLOGY ONCOLOGY | Facility: CLINIC | Age: 79
End: 2019-09-03
Payer: MEDICARE

## 2019-09-03 VITALS
HEART RATE: 71 BPM | BODY MASS INDEX: 30.65 KG/M2 | TEMPERATURE: 98 F | SYSTOLIC BLOOD PRESSURE: 156 MMHG | OXYGEN SATURATION: 96 % | DIASTOLIC BLOOD PRESSURE: 87 MMHG | WEIGHT: 188.47 LBS | RESPIRATION RATE: 16 BRPM

## 2019-09-03 PROCEDURE — 99214 OFFICE O/P EST MOD 30 MIN: CPT

## 2019-09-03 RX ORDER — METOCLOPRAMIDE 10 MG/1
10 TABLET ORAL EVERY 8 HOURS
Qty: 21 | Refills: 0 | Status: DISCONTINUED | COMMUNITY
Start: 2019-07-26 | End: 2019-09-03

## 2019-09-03 NOTE — HISTORY OF PRESENT ILLNESS
[Disease: _____________________] : Disease: [unfilled] [T: ___] : T[unfilled] [N: ___] : N[unfilled] [M: ___] : M[unfilled] [AJCC Stage: ____] : AJCC Stage: [unfilled] [de-identified] : Mr. 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 As the bump started to grow, he sought medical help and had the work up done below. \par \par PET-CT 6/17/19\par 1. Hypermetabolic enhancing left upper gingival mass corresponds to patient's known squamous cell carcinoma. Please see report of contrast-enhanced CT of same day for further description of the primary lesion. \par \par 2. Nonspecific, asymmetric hypermetabolism in left nasopharynx and left greater than right base of tongue. Please correlate with direct visualization. \par \par 3. Hypermetabolic bilateral level IB and IIA cervical lymph nodes are compatible with metastatic disease. Ultrasound-guided percutaneous needle biopsy may be obtained for confirmation. \par \par 4. Non FDG-avid right upper lobe nodular opacity is nonspecific. Correlate clinically for infection. A dedicated CT of chest is recommended in one month for further evaluation. \par \par 5. Markedly enlarged prostate gland without associated hypermetabolism.\par \par CT neck 6/17/19\par Large plaque like enhancement abnormality centered along the left maxillary gingivobuccal sulcus extending into the left maxillary sinus and toward the left infraorbital canal, and left greater palatine foramen as described. Contrast-enhanced MR imaging of the skull base can be done to evaluate for possible perineural spread of disease. \par \par Contiguous versus possible synchronous additional malignant disease seen along the left nasopharynx, extending to the uvula. Left asymmetric lingual tonsillar enhancement extending to the vallecula which may represent tonsillar hypertrophy though additional neoplastic disease is not excluded. \par \par Bilateral pathologic level I, left II, bilateral III lymphadenopathy. \par \par MRI brain 7/2/19\par A large bulky mass with associated bony destruction is again noted involving the left maxillary alveolus, floor of the left maxillary sinus, and lower aspect of the posterior lateral wall of the left maxillary sinus, with extension to the left buccal space across the gingival buccal sulcus. The tumor extends into the lower retromaxillary fat. Tumor abuts and may involve the base of the pterygoid plates and greater-lesser palatine foramina, however the pterygopalatine fossa, infraorbital canal, Meckel's caves, and cavernous sinuses appear grossly spared. A component of the tumor may spread submucosally underneath the left hard palate. \par \par Nonspecific asymmetric enhancement involving the left nasopharynx, left soft palate, uvula, and left tonsil appears stable compared to the prior CT. Correlate with direct visualization findings to exclude mucosal or submucosal spread of tumor versus lymphoid hypertrophy. \par \par Heterogeneous bilateral level 1 and level 2 lymph nodes are again noted suspicious for pathologic lymphadenopathy, stable in size compared to the CT examination. 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 Bromberg had some tachycardia and felt dizzy a few days after chemo- went to ED and received fluids which resolved the issues. Some hiccups after first cycle of chemo. Hiccups improved with Reglan. He also had some mild itching, no rash, intermitten. Some constipation, resolved with senna/colace. No pain. Increase appetite, gained weight. Denies SOB, chest pain, palpitation. Denies epistaxis. Chronic hearing problems- hearing aids- due to otosclerosis. Denies neuropathy, urinary issues, leg edema. All other ROS negative.

## 2019-09-03 NOTE — ASSESSMENT
[FreeTextEntry1] : Mr. GORDON BROMBERG is a pleasant 79 year old man who comes here today prior to cycle #3 of pembro/carbo/taxol to treat  his diagnosis of maxillary sinus carcinoma. Significant decrease in the size of tumor after 1 cycle of therapy. \par \par Renewed labs- proceed with cycle #3 on 9/4/19. Ordered MRI to evaluate radiological response to treatment.\par \par History of dehydration after chemo- scheduled IV fluids after chemo.\par \par Hypothyroidism- on levothyroxine 75 mcg/day. \par \par Hiccups- instructed to take Reglan if that happens again. \par \par Pruritus- due to pembro. Counselled. Instructed to let us know if it worsens. \par \par There is no change in past medical history, social history or family history since our last visit. Cancer diagnosis does not affect other existing medical issues at the time.\par \par \par Return to clinic in 3 weeks. \par \par Cassia Campbell MD\par , [facility]\par [facility]\par [address]\[place], [place], [address]\par Tel: [phone]\par Fax: [phone]\par \par

## 2019-09-03 NOTE — PHYSICAL EXAM
[Fully active, able to carry on all pre-disease performance without restriction] : Status 0 - Fully active, able to carry on all pre-disease performance without restriction [Obese] : obese [Normal] : affect appropriate [de-identified] : alopecia [de-identified] : erosive mass in the left maxillary alveolus is not seen on today's examination [de-identified] : ventral hernia, liver edge palpable

## 2019-09-04 ENCOUNTER — RESULT REVIEW (OUTPATIENT)
Age: 79
End: 2019-09-04

## 2019-09-04 ENCOUNTER — APPOINTMENT (OUTPATIENT)
Dept: INFUSION THERAPY | Facility: HOSPITAL | Age: 79
End: 2019-09-04

## 2019-09-04 LAB
BASOPHILS # BLD AUTO: 0.1 K/UL — SIGNIFICANT CHANGE UP (ref 0–0.2)
BASOPHILS NFR BLD AUTO: 1 % — SIGNIFICANT CHANGE UP (ref 0–2)
EOSINOPHIL # BLD AUTO: 0.2 K/UL — SIGNIFICANT CHANGE UP (ref 0–0.5)
EOSINOPHIL NFR BLD AUTO: 3.2 % — SIGNIFICANT CHANGE UP (ref 0–6)
HCT VFR BLD CALC: 42 % — SIGNIFICANT CHANGE UP (ref 39–50)
HGB BLD-MCNC: 13.8 G/DL — SIGNIFICANT CHANGE UP (ref 13–17)
LYMPHOCYTES # BLD AUTO: 1.2 K/UL — SIGNIFICANT CHANGE UP (ref 1–3.3)
LYMPHOCYTES # BLD AUTO: 19.3 % — SIGNIFICANT CHANGE UP (ref 13–44)
MCHC RBC-ENTMCNC: 29.9 PG — SIGNIFICANT CHANGE UP (ref 27–34)
MCHC RBC-ENTMCNC: 32.8 G/DL — SIGNIFICANT CHANGE UP (ref 32–36)
MCV RBC AUTO: 91.1 FL — SIGNIFICANT CHANGE UP (ref 80–100)
MONOCYTES # BLD AUTO: 1 K/UL — HIGH (ref 0–0.9)
MONOCYTES NFR BLD AUTO: 15.9 % — HIGH (ref 2–14)
NEUTROPHILS # BLD AUTO: 3.7 K/UL — SIGNIFICANT CHANGE UP (ref 1.8–7.4)
NEUTROPHILS NFR BLD AUTO: 60.7 % — SIGNIFICANT CHANGE UP (ref 43–77)
PLATELET # BLD AUTO: 355 K/UL — SIGNIFICANT CHANGE UP (ref 150–400)
RBC # BLD: 4.62 M/UL — SIGNIFICANT CHANGE UP (ref 4.2–5.8)
RBC # FLD: 13.2 % — SIGNIFICANT CHANGE UP (ref 10.3–14.5)
WBC # BLD: 6.1 K/UL — SIGNIFICANT CHANGE UP (ref 3.8–10.5)
WBC # FLD AUTO: 6.1 K/UL — SIGNIFICANT CHANGE UP (ref 3.8–10.5)

## 2019-09-05 ENCOUNTER — APPOINTMENT (OUTPATIENT)
Dept: INFUSION THERAPY | Facility: HOSPITAL | Age: 79
End: 2019-09-05

## 2019-09-06 DIAGNOSIS — E86.0 DEHYDRATION: ICD-10-CM

## 2019-09-07 ENCOUNTER — FORM ENCOUNTER (OUTPATIENT)
Age: 79
End: 2019-09-07

## 2019-09-07 ENCOUNTER — OUTPATIENT (OUTPATIENT)
Dept: OUTPATIENT SERVICES | Facility: HOSPITAL | Age: 79
LOS: 1 days | Discharge: HOME | End: 2019-09-07

## 2019-09-07 DIAGNOSIS — C76.0 MALIGNANT NEOPLASM OF HEAD, FACE AND NECK: ICD-10-CM

## 2019-09-07 DIAGNOSIS — C06.9 MALIGNANT NEOPLASM OF MOUTH, UNSPECIFIED: ICD-10-CM

## 2019-09-08 ENCOUNTER — APPOINTMENT (OUTPATIENT)
Dept: MRI IMAGING | Facility: IMAGING CENTER | Age: 79
End: 2019-09-08
Payer: MEDICARE

## 2019-09-08 ENCOUNTER — OUTPATIENT (OUTPATIENT)
Dept: OUTPATIENT SERVICES | Facility: HOSPITAL | Age: 79
LOS: 1 days | End: 2019-09-08
Payer: MEDICARE

## 2019-09-08 DIAGNOSIS — C03.0 MALIGNANT NEOPLASM OF UPPER GUM: ICD-10-CM

## 2019-09-08 PROCEDURE — 70543 MRI ORBT/FAC/NCK W/O &W/DYE: CPT | Mod: 26

## 2019-09-08 PROCEDURE — A9585: CPT

## 2019-09-08 PROCEDURE — 70543 MRI ORBT/FAC/NCK W/O &W/DYE: CPT

## 2019-09-10 ENCOUNTER — APPOINTMENT (OUTPATIENT)
Dept: OTOLARYNGOLOGY | Facility: CLINIC | Age: 79
End: 2019-09-10
Payer: MEDICARE

## 2019-09-10 ENCOUNTER — APPOINTMENT (OUTPATIENT)
Dept: INFUSION THERAPY | Facility: HOSPITAL | Age: 79
End: 2019-09-10

## 2019-09-10 VITALS
HEIGHT: 65.75 IN | WEIGHT: 188 LBS | BODY MASS INDEX: 30.58 KG/M2 | SYSTOLIC BLOOD PRESSURE: 162 MMHG | HEART RATE: 65 BPM | DIASTOLIC BLOOD PRESSURE: 91 MMHG

## 2019-09-10 PROCEDURE — 99214 OFFICE O/P EST MOD 30 MIN: CPT | Mod: 25

## 2019-09-10 PROCEDURE — 31231 NASAL ENDOSCOPY DX: CPT

## 2019-09-12 ENCOUNTER — APPOINTMENT (OUTPATIENT)
Dept: INFUSION THERAPY | Facility: HOSPITAL | Age: 79
End: 2019-09-12

## 2019-09-13 ENCOUNTER — OUTPATIENT (OUTPATIENT)
Dept: OUTPATIENT SERVICES | Facility: HOSPITAL | Age: 79
LOS: 1 days | Discharge: HOME | End: 2019-09-13
Payer: MEDICARE

## 2019-09-13 DIAGNOSIS — E86.0 DEHYDRATION: ICD-10-CM

## 2019-09-15 ENCOUNTER — OTHER (OUTPATIENT)
Age: 79
End: 2019-09-15

## 2019-09-19 ENCOUNTER — APPOINTMENT (OUTPATIENT)
Dept: RADIATION ONCOLOGY | Facility: CLINIC | Age: 79
End: 2019-09-19
Payer: MEDICARE

## 2019-09-19 VITALS
WEIGHT: 189.95 LBS | HEART RATE: 71 BPM | OXYGEN SATURATION: 95 % | RESPIRATION RATE: 14 BRPM | TEMPERATURE: 98.6 F | BODY MASS INDEX: 30.89 KG/M2 | SYSTOLIC BLOOD PRESSURE: 144 MMHG | DIASTOLIC BLOOD PRESSURE: 76 MMHG

## 2019-09-19 DIAGNOSIS — Z86.69 PERSONAL HISTORY OF OTHER DISEASES OF THE NERVOUS SYSTEM AND SENSE ORGANS: ICD-10-CM

## 2019-09-19 PROCEDURE — 99204 OFFICE O/P NEW MOD 45 MIN: CPT | Mod: 25

## 2019-09-19 PROCEDURE — 77263 THER RADIOLOGY TX PLNG CPLX: CPT

## 2019-09-23 ENCOUNTER — RESULT REVIEW (OUTPATIENT)
Age: 79
End: 2019-09-23

## 2019-09-23 ENCOUNTER — APPOINTMENT (OUTPATIENT)
Dept: HEMATOLOGY ONCOLOGY | Facility: CLINIC | Age: 79
End: 2019-09-23

## 2019-09-23 LAB
ALBUMIN SERPL ELPH-MCNC: 4.3 G/DL
ALP BLD-CCNC: 66 U/L
ALT SERPL-CCNC: 27 U/L
ANION GAP SERPL CALC-SCNC: 12 MMOL/L
AST SERPL-CCNC: 24 U/L
BASOPHILS # BLD AUTO: 0 K/UL — SIGNIFICANT CHANGE UP (ref 0–0.2)
BASOPHILS NFR BLD AUTO: 1 % — SIGNIFICANT CHANGE UP (ref 0–2)
BILIRUB SERPL-MCNC: 0.2 MG/DL
BUN SERPL-MCNC: 11 MG/DL
CALCIUM SERPL-MCNC: 9.5 MG/DL
CHLORIDE SERPL-SCNC: 103 MMOL/L
CO2 SERPL-SCNC: 25 MMOL/L
CREAT SERPL-MCNC: 1.01 MG/DL
EOSINOPHIL # BLD AUTO: 0.2 K/UL — SIGNIFICANT CHANGE UP (ref 0–0.5)
EOSINOPHIL NFR BLD AUTO: 3 % — SIGNIFICANT CHANGE UP (ref 0–6)
GLUCOSE SERPL-MCNC: 106 MG/DL
HCT VFR BLD CALC: 41 % — SIGNIFICANT CHANGE UP (ref 39–50)
HGB BLD-MCNC: 13.5 G/DL — SIGNIFICANT CHANGE UP (ref 13–17)
LYMPHOCYTES # BLD AUTO: 1 K/UL — SIGNIFICANT CHANGE UP (ref 1–3.3)
LYMPHOCYTES # BLD AUTO: 42 % — SIGNIFICANT CHANGE UP (ref 13–44)
MCHC RBC-ENTMCNC: 30.3 PG — SIGNIFICANT CHANGE UP (ref 27–34)
MCHC RBC-ENTMCNC: 32.9 G/DL — SIGNIFICANT CHANGE UP (ref 32–36)
MCV RBC AUTO: 92.1 FL — SIGNIFICANT CHANGE UP (ref 80–100)
MONOCYTES # BLD AUTO: 1 K/UL — HIGH (ref 0–0.9)
MONOCYTES NFR BLD AUTO: 29 % — HIGH (ref 2–14)
NEUTROPHILS # BLD AUTO: 1.3 K/UL — LOW (ref 1.8–7.4)
NEUTROPHILS NFR BLD AUTO: 25 % — LOW (ref 43–77)
PLAT MORPH BLD: NORMAL — SIGNIFICANT CHANGE UP
PLATELET # BLD AUTO: 349 K/UL — SIGNIFICANT CHANGE UP (ref 150–400)
POTASSIUM SERPL-SCNC: 4.4 MMOL/L
PROT SERPL-MCNC: 6.7 G/DL
RBC # BLD: 4.46 M/UL — SIGNIFICANT CHANGE UP (ref 4.2–5.8)
RBC # FLD: 14.9 % — HIGH (ref 10.3–14.5)
RBC BLD AUTO: NORMAL — SIGNIFICANT CHANGE UP
SODIUM SERPL-SCNC: 140 MMOL/L
WBC # BLD: 3.6 K/UL — LOW (ref 3.8–10.5)
WBC # FLD AUTO: 3.6 K/UL — LOW (ref 3.8–10.5)

## 2019-09-23 NOTE — HISTORY OF PRESENT ILLNESS
[FreeTextEntry1] : 80 y/o male with h/o chemo for left maxillary sinus cancer in 9/2019 presents for consideration of radiation treatment.\par \par  PET-CT 6/17/19\par 1. Hypermetabolic enhancing left upper gingival mass corresponds to patient's known squamous cell carcinoma. Please see report of contrast-enhanced CT of same day for further description of the primary lesion. \par 2. Nonspecific, asymmetric hypermetabolism in left nasopharynx and left greater than right base of tongue. Please correlate with direct visualization. \par 3. Hypermetabolic bilateral level IB and IIA cervical lymph nodes are compatible with metastatic disease. Ultrasound-guided percutaneous needle biopsy may be obtained for confirmation. \par 4. Non FDG-avid right upper lobe nodular opacity is nonspecific. Correlate clinically for infection. A dedicated CT of chest is recommended in one month for further evaluation. \par 5. Markedly enlarged prostate gland without associated hypermetabolism.\par \par CT neck 6/17/19\par Large plaque like enhancement abnormality centered along the left maxillary gingivobuccal sulcus extending into the left maxillary sinus and toward the left infraorbital canal, and left greater palatine foramen as described. Contrast-enhanced MR imaging of the skull base can be done to evaluate for possible perineural spread of disease. \par Contiguous versus possible synchronous additional malignant disease seen along the left nasopharynx, extending to the uvula. Left asymmetric lingual tonsillar enhancement extending to the vallecula which may represent tonsillar hypertrophy though additional neoplastic disease is not excluded. \par Bilateral pathologic level I, left II, bilateral III lymphadenopathy. \par \par MRI brain 7/2/19\par A large bulky mass with associated bony destruction is again noted involving the left maxillary alveolus, floor of the left maxillary sinus, and lower aspect of the posterior lateral wall of the left maxillary sinus, with extension to the left buccal space across the gingival buccal sulcus. The tumor extends into the lower retromaxillary fat. Tumor abuts and may involve the base of the pterygoid plates and greater-lesser palatine foramina, however the pterygopalatine fossa, infraorbital canal, Meckel's caves, and cavernous sinuses appear grossly spared. A component of the tumor may spread submucosally underneath the left hard palate. \par Nonspecific asymmetric enhancement involving the left nasopharynx, left soft palate, uvula, and left tonsil appears stable compared to the prior CT. Correlate with direct visualization findings to exclude mucosal or submucosal spread of tumor versus lymphoid hypertrophy. \par Heterogeneous bilateral level 1 and level 2 lymph nodes are again noted suspicious for pathologic lymphadenopathy, stable in size compared to the CT examination. 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 \par \par Pt still on chemo. \par \par Side effects of radiation therapy to the head and neck were discussed with patient, including but not limited to skin reaction, fatigue, difficulty swallowing, feeding tube dependent, change in appetite and taste, nausea, bone marrow suppression, cough or shortness of breath. Patient is referred to speech and swallowing evaluation, nutritionist and .

## 2019-09-24 ENCOUNTER — APPOINTMENT (OUTPATIENT)
Dept: HEMATOLOGY ONCOLOGY | Facility: CLINIC | Age: 79
End: 2019-09-24
Payer: MEDICARE

## 2019-09-24 ENCOUNTER — APPOINTMENT (OUTPATIENT)
Dept: INFUSION THERAPY | Facility: HOSPITAL | Age: 79
End: 2019-09-24

## 2019-09-24 VITALS
SYSTOLIC BLOOD PRESSURE: 166 MMHG | HEART RATE: 61 BPM | DIASTOLIC BLOOD PRESSURE: 84 MMHG | RESPIRATION RATE: 15 BRPM | WEIGHT: 190.48 LBS | OXYGEN SATURATION: 98 % | BODY MASS INDEX: 30.98 KG/M2 | TEMPERATURE: 98.2 F

## 2019-09-24 DIAGNOSIS — R06.6 HICCOUGH: ICD-10-CM

## 2019-09-24 DIAGNOSIS — Z79.899 OTHER LONG TERM (CURRENT) DRUG THERAPY: ICD-10-CM

## 2019-09-24 DIAGNOSIS — Z87.898 PERSONAL HISTORY OF OTHER SPECIFIED CONDITIONS: ICD-10-CM

## 2019-09-24 PROCEDURE — 99214 OFFICE O/P EST MOD 30 MIN: CPT

## 2019-09-24 RX ORDER — DEXAMETHASONE 4 MG/1
4 TABLET ORAL
Qty: 4 | Refills: 6 | Status: DISCONTINUED | COMMUNITY
Start: 2019-07-20 | End: 2019-09-24

## 2019-09-24 NOTE — PHYSICAL EXAM
[Restricted in physically strenuous activity but ambulatory and able to carry out work of a light or sedentary nature] : Status 1- Restricted in physically strenuous activity but ambulatory and able to carry out work of a light or sedentary nature, e.g., light house work, office work [Obese] : obese [Normal] : affect appropriate [de-identified] : alopecia [de-identified] : ventral hernia, liver edge palpable [de-identified] : no tumor is seen, no thrush, no mucositis

## 2019-09-24 NOTE — ASSESSMENT
[Palliative] : Goals of care discussed with patient: Palliative [FreeTextEntry1] : Mr. GORDON BROMBERG is a pleasant 79 year old man who comes here today after 3 cycles of pembro/carbo/taxol to treat  his diagnosis of maxillary sinus carcinoma. \par \par Reviewed MRI results with patient and wife. Almost complete response. Discussed case at [facility] Tumor [facility]- proceed with radiation- discussed this with patient. Patient will need feeding tube- refereed to Dr. Ramirez. Will need IV fluids during radiation. \par \par Hypothyroidism- on levothyroxine 75 mcg/day. \par \par \par There is no change in past medical history, social history or family history since our last visit. Cancer diagnosis does not affect other existing medical issues at the time.\par \par \par Cassia Campbell MD\par , [facility]\par [facility]\par [address]\[place], [address]\par Tel: [phone]\par Fax: [phone]\par \par

## 2019-09-24 NOTE — HISTORY OF PRESENT ILLNESS
[Disease: _____________________] : Disease: [unfilled] [T: ___] : T[unfilled] [N: ___] : N[unfilled] [M: ___] : M[unfilled] [AJCC Stage: ____] : AJCC Stage: [unfilled] [de-identified] : SCC [de-identified] : Mr. 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 As the bump started to grow, he sought medical help and had the work up done below. \par \par PET-CT 6/17/19\par 1. Hypermetabolic enhancing left upper gingival mass corresponds to patient's known squamous cell carcinoma. Please see report of contrast-enhanced CT of same day for further description of the primary lesion. \par \par 2. Nonspecific, asymmetric hypermetabolism in left nasopharynx and left greater than right base of tongue. Please correlate with direct visualization. \par \par 3. Hypermetabolic bilateral level IB and IIA cervical lymph nodes are compatible with metastatic disease. Ultrasound-guided percutaneous needle biopsy may be obtained for confirmation. \par \par 4. Non FDG-avid right upper lobe nodular opacity is nonspecific. Correlate clinically for infection. A dedicated CT of chest is recommended in one month for further evaluation. \par \par 5. Markedly enlarged prostate gland without associated hypermetabolism.\par \par CT neck 6/17/19\par Large plaque like enhancement abnormality centered along the left maxillary gingivobuccal sulcus extending into the left maxillary sinus and toward the left infraorbital canal, and left greater palatine foramen as described. Contrast-enhanced MR imaging of the skull base can be done to evaluate for possible perineural spread of disease. \par \par Contiguous versus possible synchronous additional malignant disease seen along the left nasopharynx, extending to the uvula. Left asymmetric lingual tonsillar enhancement extending to the vallecula which may represent tonsillar hypertrophy though additional neoplastic disease is not excluded. \par \par Bilateral pathologic level I, left II, bilateral III lymphadenopathy. \par \par MRI brain 7/2/19\par A large bulky mass with associated bony destruction is again noted involving the left maxillary alveolus, floor of the left maxillary sinus, and lower aspect of the posterior lateral wall of the left maxillary sinus, with extension to the left buccal space across the gingival buccal sulcus. The tumor extends into the lower retromaxillary fat. Tumor abuts and may involve the base of the pterygoid plates and greater-lesser palatine foramina, however the pterygopalatine fossa, infraorbital canal, Meckel's caves, and cavernous sinuses appear grossly spared. A component of the tumor may spread submucosally underneath the left hard palate. \par \par Nonspecific asymmetric enhancement involving the left nasopharynx, left soft palate, uvula, and left tonsil appears stable compared to the prior CT. Correlate with direct visualization findings to exclude mucosal or submucosal spread of tumor versus lymphoid hypertrophy. \par \par Heterogeneous bilateral level 1 and level 2 lymph nodes are again noted suspicious for pathologic lymphadenopathy, stable in size compared to the CT examination. 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 Bromberg is doing well. Alopecia. No pain. Increase appetite, gained weight. Denies SOB, chest pain, palpitation. Denies epistaxis. Chronic hearing problems- hearing aids- due to otosclerosis. Denies neuropathy, urinary issues, leg edema. All other ROS negative.

## 2019-10-01 ENCOUNTER — OUTPATIENT (OUTPATIENT)
Dept: OUTPATIENT SERVICES | Facility: HOSPITAL | Age: 79
LOS: 1 days | Discharge: HOME | End: 2019-10-01

## 2019-10-01 ENCOUNTER — APPOINTMENT (OUTPATIENT)
Dept: SPEECH THERAPY | Facility: CLINIC | Age: 79
End: 2019-10-01

## 2019-10-04 ENCOUNTER — OUTPATIENT (OUTPATIENT)
Dept: OUTPATIENT SERVICES | Facility: HOSPITAL | Age: 79
LOS: 1 days | Discharge: HOME | End: 2019-10-04

## 2019-10-04 DIAGNOSIS — C76.0 MALIGNANT NEOPLASM OF HEAD, FACE AND NECK: ICD-10-CM

## 2019-10-04 DIAGNOSIS — C06.9 MALIGNANT NEOPLASM OF MOUTH, UNSPECIFIED: ICD-10-CM

## 2019-10-08 ENCOUNTER — APPOINTMENT (OUTPATIENT)
Dept: THORACIC SURGERY | Facility: CLINIC | Age: 79
End: 2019-10-08
Payer: MEDICARE

## 2019-10-08 ENCOUNTER — APPOINTMENT (OUTPATIENT)
Dept: HEMATOLOGY ONCOLOGY | Facility: CLINIC | Age: 79
End: 2019-10-08

## 2019-10-08 VITALS
BODY MASS INDEX: 30.53 KG/M2 | TEMPERATURE: 98.6 F | WEIGHT: 190 LBS | HEART RATE: 68 BPM | OXYGEN SATURATION: 91 % | DIASTOLIC BLOOD PRESSURE: 83 MMHG | HEIGHT: 66 IN | SYSTOLIC BLOOD PRESSURE: 132 MMHG

## 2019-10-08 PROCEDURE — 99205 OFFICE O/P NEW HI 60 MIN: CPT

## 2019-10-09 DIAGNOSIS — R13.12 DYSPHAGIA, OROPHARYNGEAL PHASE: ICD-10-CM

## 2019-10-09 PROCEDURE — 77338 DESIGN MLC DEVICE FOR IMRT: CPT | Mod: 26

## 2019-10-09 PROCEDURE — 77300 RADIATION THERAPY DOSE PLAN: CPT | Mod: 26

## 2019-10-09 PROCEDURE — 77301 RADIOTHERAPY DOSE PLAN IMRT: CPT | Mod: 26

## 2019-10-10 ENCOUNTER — OUTPATIENT (OUTPATIENT)
Dept: OUTPATIENT SERVICES | Facility: HOSPITAL | Age: 79
LOS: 1 days | Discharge: HOME | End: 2019-10-10
Payer: MEDICARE

## 2019-10-10 ENCOUNTER — APPOINTMENT (OUTPATIENT)
Dept: THORACIC SURGERY | Facility: HOSPITAL | Age: 79
End: 2019-10-10

## 2019-10-10 DIAGNOSIS — C03.0 MALIGNANT NEOPLASM OF UPPER GUM: ICD-10-CM

## 2019-10-10 PROCEDURE — 43246 EGD PLACE GASTROSTOMY TUBE: CPT

## 2019-10-10 RX ORDER — ACETAMINOPHEN 500 MG
1000 TABLET ORAL ONCE
Refills: 0 | Status: DISCONTINUED | OUTPATIENT
Start: 2019-10-10 | End: 2019-10-10

## 2019-10-10 RX ORDER — SODIUM CHLORIDE 9 MG/ML
1000 INJECTION, SOLUTION INTRAVENOUS
Refills: 0 | Status: DISCONTINUED | OUTPATIENT
Start: 2019-10-10 | End: 2019-10-25

## 2019-10-10 RX ORDER — ACETAMINOPHEN 500 MG
1000 TABLET ORAL ONCE
Refills: 0 | Status: COMPLETED | OUTPATIENT
Start: 2019-10-10 | End: 2019-10-10

## 2019-10-10 RX ADMIN — Medication 400 MILLIGRAM(S): at 17:05

## 2019-10-11 PROCEDURE — 77387B: CUSTOM | Mod: 26

## 2019-10-11 PROCEDURE — 77427 RADIATION TX MANAGEMENT X5: CPT

## 2019-10-11 NOTE — ASSESSMENT
[FreeTextEntry1] : 80 y/o male w/ maxillary sinus cancer, who has completed chemotherapy 1 month ago, and radiation to start this Thursday 10/10/19.  Patient is referred by Dr. Campbell (oncology) and Dr. Calvin (radiation oncology) for a feeding tube evaluation.\par \par I have reviewed the patient's medical records and diagnostic images during the time of this office visit, and I have made the following recommendation:\par - Patient scheduled for PEG in endoscopy suite on Thursday, 10/10/19\par - Consent signed and in chart\par - Radiation to follow at [facility] on Thursday evening, 10/10/19\par \par Written by Darek Faith PA-C, acting as scribe for Dr. Silverio Ramirez\par The documentation recorded by the scribe accurately reflects the service I personally performed and the decisions made by me, Dr. Silverio Ramirez.\par

## 2019-10-11 NOTE — PHYSICAL EXAM
[General Appearance - In No Acute Distress] : in no acute distress [General Appearance - Alert] : alert [General Appearance - Well Nourished] : well nourished [General Appearance - Well Developed] : well developed [Sclera] : the sclera and conjunctiva were normal [PERRL With Normal Accommodation] : pupils were equal in size, round, and reactive to light [Extraocular Movements] : extraocular movements were intact [Outer Ear] : the ears and nose were normal in appearance [Oropharynx] : the oropharynx was normal [Neck Appearance] : the appearance of the neck was normal [Jugular Venous Distention Increased] : there was no jugular-venous distention [Neck Cervical Mass (___cm)] : no neck mass was observed [Thyroid Diffuse Enlargement] : the thyroid was not enlarged [Thyroid Nodule] : there were no palpable thyroid nodules [Respiration, Rhythm And Depth] : normal respiratory rhythm and effort [Auscultation Breath Sounds / Voice Sounds] : lungs were clear to auscultation bilaterally [Heart Rate And Rhythm] : heart rate was normal and rhythm regular [Heart Sounds] : normal S1 and S2 [Murmurs] : no murmurs [Heart Sounds Gallop] : no gallops [Heart Sounds Pericardial Friction Rub] : no pericardial rub [Chest Visual Inspection Thoracic Asymmetry] : no chest asymmetry [Examination Of The Chest] : the chest was normal in appearance [Diminished Respiratory Excursion] : normal chest expansion [2+] : left 2+ [No Abnormalities] : the abdominal aorta was not enlarged and no bruit was heard [Breast Palpation Mass] : no palpable masses [Breast Appearance] : normal in appearance [Bowel Sounds] : normal bowel sounds [Abdomen Soft] : soft [Abdomen Tenderness] : non-tender [Abdomen Mass (___ Cm)] : no abdominal mass palpated [Cervical Lymph Nodes Enlarged Posterior Bilaterally] : posterior cervical [Cervical Lymph Nodes Enlarged Anterior Bilaterally] : anterior cervical [Supraclavicular Lymph Nodes Enlarged Bilaterally] : supraclavicular [Axillary Lymph Nodes Enlarged Bilaterally] : axillary [Femoral Lymph Nodes Enlarged Bilaterally] : femoral [Inguinal Lymph Nodes Enlarged Bilaterally] : inguinal [No CVA Tenderness] : no ~M costovertebral angle tenderness [No Spinal Tenderness] : no spinal tenderness [Abnormal Walk] : normal gait [Nail Clubbing] : no clubbing  or cyanosis of the fingernails [Musculoskeletal - Swelling] : no joint swelling seen [Motor Tone] : muscle strength and tone were normal [Skin Color & Pigmentation] : normal skin color and pigmentation [Skin Turgor] : normal skin turgor [] : no rash [Deep Tendon Reflexes (DTR)] : deep tendon reflexes were 2+ and symmetric [Sensation] : the sensory exam was normal to light touch and pinprick [No Focal Deficits] : no focal deficits [Oriented To Time, Place, And Person] : oriented to person, place, and time [Impaired Insight] : insight and judgment were intact [Affect] : the affect was normal [Right Carotid Bruit] : no bruit heard over the right carotid [Left Carotid Bruit] : no bruit heard over the left carotid [Right Femoral Bruit] : no bruit heard over the right femoral artery [Left Femoral Bruit] : no bruit heard over the left femoral artery [FreeTextEntry1] : deferred

## 2019-10-11 NOTE — HISTORY OF PRESENT ILLNESS
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  Patient is referred by Dr. Campbell (oncology) and Dr. Calvin (radiation oncology) for a feeding tube evaluation.\par \par Patient denies any chest pains, SOB, dizziness at this time.\par \par PET-CT 6/17/19\par 1. Hypermetabolic enhancing left upper gingival mass corresponds to patient's known squamous cell carcinoma. Please see report of contrast-enhanced CT of same day for further description of the primary lesion. \par \par 2. Nonspecific, asymmetric hypermetabolism in left nasopharynx and left greater than right base of tongue. Please correlate with direct visualization. \par \par 3. Hypermetabolic bilateral level IB and IIA cervical lymph nodes are compatible with metastatic disease. Ultrasound-guided percutaneous needle biopsy may be obtained for confirmation. \par \par 4. Non FDG-avid right upper lobe nodular opacity is nonspecific. Correlate clinically for infection. A dedicated CT of chest is recommended in one month for further evaluation. \par \par 5. Markedly enlarged prostate gland without associated hypermetabolism.\par \par CT neck 6/17/19\par Large plaque like enhancement abnormality centered along the left maxillary gingivobuccal sulcus extending into the left maxillary sinus and toward the left infraorbital canal, and left greater palatine foramen as described. Contrast-enhanced MR imaging of the skull base can be done to evaluate for possible perineural spread of disease. \par \par Contiguous versus possible synchronous additional malignant disease seen along the left nasopharynx, extending to the uvula. Left asymmetric lingual tonsillar enhancement extending to the vallecula which may represent tonsillar hypertrophy though additional neoplastic disease is not excluded. \par \par Bilateral pathologic level I, left II, bilateral III lymphadenopathy. \par \par MRI brain 7/2/19\par A large bulky mass with associated bony destruction is again noted involving the left maxillary alveolus, floor of the left maxillary sinus, and lower aspect of the posterior lateral wall of the left maxillary sinus, with extension to the left buccal space across the gingival buccal sulcus. The tumor extends into the lower retromaxillary fat. Tumor abuts and may involve the base of the pterygoid plates and greater-lesser palatine foramina, however the pterygopalatine fossa, infraorbital canal, Meckel's caves, and cavernous sinuses appear grossly spared. A component of the tumor may spread submucosally underneath the left hard palate. \par \par Nonspecific asymmetric enhancement involving the left nasopharynx, left soft palate, uvula, and left tonsil appears stable compared to the prior CT. Correlate with direct visualization findings to exclude mucosal or submucosal spread of tumor versus lymphoid hypertrophy. \par \par Heterogeneous bilateral level 1 and level 2 lymph nodes are again noted suspicious for pathologic lymphadenopathy, stable in size compared to the CT examination. 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

## 2019-10-11 NOTE — CONSULT LETTER
[Consult Letter:] : I had the pleasure of evaluating your patient, [unfilled]. [Please see my note below.] : Please see my note below. [( Thank you for referring [unfilled] for consultation for _____ )] : Thank you for referring [unfilled] for consultation for [unfilled] [Sincerely,] : Sincerely, [FreeTextEntry2] : Dr. Cassia Campbell\par Dr. Josy Garibay [FreeTextEntry3] : Silverio Ramirez MD, FACS \par Chief, Division of Thoracic Surgery \par Director, Minimally Invasive Thoracic Surgery \par Department of Cardiovascular and Thoracic Surgery \par [facility] \par , Cardiovascular and Thoracic Surgery\par

## 2019-10-14 PROCEDURE — 77387B: CUSTOM | Mod: 26

## 2019-10-15 PROCEDURE — 77387B: CUSTOM | Mod: 26

## 2019-10-16 VITALS — RESPIRATION RATE: 16 BRPM | HEIGHT: 66 IN | WEIGHT: 190 LBS | BODY MASS INDEX: 30.53 KG/M2

## 2019-10-16 PROCEDURE — 77387B: CUSTOM | Mod: 26

## 2019-10-16 NOTE — REVIEW OF SYSTEMS
[Dysphagia: Grade 0] : Dysphagia: Grade 0 [Tinnitus - Grade 0] : Tinnitus - Grade 0 [Blurred Vision: Grade 0] : Blurred Vision: Grade 0 [Mucositis Oral: Grade 0] : Mucositis Oral: Grade 0  [Xerostomia: Grade 1 - Symptomatic (e.g., dry or thick saliva) without significant dietary alteration; unstimulated saliva flow >0.2 ml/min] : Xerostomia: Grade 1 - Symptomatic (e.g., dry or thick saliva) without significant dietary alteration; unstimulated saliva flow >0.2 ml/min [Oral Pain: Grade 0] : Oral Pain: Grade 0 [Salivary duct inflammation: Grade 0] : Salivary duct inflammation: Grade 0 [Dysgeusia: Grade 0] : Dysgeusia: Grade 0 [Alopecia: Grade 0] : Alopecia: Grade 0 [Pruritus: Grade 0] : Pruritus: Grade 0 [Skin Atrophy: Grade 0] : Skin Atrophy: Grade 0 [Skin Hyperpigmentation: Grade 0] : Skin Hyperpigmentation: Grade 0 [Skin Induration: Grade 0] : Skin Induration: Grade 0 [Dermatitis Radiation: Grade 0] : Dermatitis Radiation: Grade 0

## 2019-10-17 ENCOUNTER — APPOINTMENT (OUTPATIENT)
Dept: THORACIC SURGERY | Facility: CLINIC | Age: 79
End: 2019-10-17

## 2019-10-17 VITALS
DIASTOLIC BLOOD PRESSURE: 77 MMHG | HEIGHT: 66 IN | SYSTOLIC BLOOD PRESSURE: 161 MMHG | BODY MASS INDEX: 30.53 KG/M2 | TEMPERATURE: 98.4 F | OXYGEN SATURATION: 94 % | HEART RATE: 66 BPM | RESPIRATION RATE: 15 BRPM | WEIGHT: 190 LBS

## 2019-10-17 PROCEDURE — G6002: CPT | Mod: 26

## 2019-10-17 PROCEDURE — 77014: CPT | Mod: 26

## 2019-10-17 PROCEDURE — 77387B: CUSTOM | Mod: 26

## 2019-10-18 PROCEDURE — 77387B: CUSTOM | Mod: 26

## 2019-10-18 PROCEDURE — 77427 RADIATION TX MANAGEMENT X5: CPT

## 2019-10-21 PROCEDURE — 77387B: CUSTOM | Mod: 26

## 2019-10-22 ENCOUNTER — APPOINTMENT (OUTPATIENT)
Dept: OTOLARYNGOLOGY | Facility: CLINIC | Age: 79
End: 2019-10-22
Payer: MEDICARE

## 2019-10-22 VITALS
SYSTOLIC BLOOD PRESSURE: 153 MMHG | WEIGHT: 190 LBS | DIASTOLIC BLOOD PRESSURE: 84 MMHG | BODY MASS INDEX: 30.53 KG/M2 | HEART RATE: 71 BPM | HEIGHT: 66 IN

## 2019-10-22 PROCEDURE — 31231 NASAL ENDOSCOPY DX: CPT

## 2019-10-22 PROCEDURE — 77387B: CUSTOM | Mod: 26

## 2019-10-22 PROCEDURE — 99214 OFFICE O/P EST MOD 30 MIN: CPT | Mod: 25

## 2019-10-22 NOTE — HISTORY OF PRESENT ILLNESS
[de-identified] : Pt is here s/p his third chemo cycle on 9/4/19.  Pt did not have his 4th chemo.  He has now completed his 8th RT today.  He has a feeding tube but is only flushing with water.  Pt is feeling well today.  He denies any dyspnea, dysphagia. His weight is stable.

## 2019-10-22 NOTE — PROCEDURE
[Mass] : a mass [None] : none [Flexible Endoscope] : examined with the flexible endoscope [Normal] : the middle meatus had no abnormalities [FreeTextEntry6] : There is no involvement in the NPx and the soft palate appears clear as well. \par  [Serial Number: ___] : Serial Number: [unfilled]

## 2019-10-22 NOTE — CONSULT LETTER
[Dear  ___] : Dear  [unfilled], [Courtesy Letter:] : I had the pleasure of seeing your patient, [unfilled], in my office today. [Please see my note below.] : Please see my note below. [Consult Closing:] : Thank you very much for allowing me to participate in the care of this patient.  If you have any questions, please do not hesitate to contact me. [Sincerely,] : Sincerely, [FreeTextEntry2] : Dr. Jack Lundy\par [address], \[place] [address]  [FreeTextEntry3] : Dev\par \par Ho Roca MD FACS\par Division of Head and Neck Surgery\par Department of Otolaryngology \par [facility]\par \par  of Otolaryngology\par Assistant Professor of Surgery\par [employer] [facility] [employer] [facility]

## 2019-10-22 NOTE — DATA REVIEWED
[de-identified] : MRI with the area of abnormal soft tissue thickening previously seen involving the left maxillary alveolus and gingivobuccal sulcus has decreased in size and is more linear in appearance at this time. Tumor extension into the floor of the adjacent maxillary sinuses previously has almost completely resolved. Soft tissue thickening and enhancement along the hard palate more medially has also almost completely resolved. no new areas of tumor involvement are seen. Soft tissue again extends posteriorly toward the pterygoid plate without definite bony infiltration. There is no involvement of the  space and no significant infiltration of the greater palatine canal is noted at this time. There is no involvement of the pterygopalatine fossa, orbital fissures or \par cavernous sinus.

## 2019-10-22 NOTE — PHYSICAL EXAM
[Normal] : orientation to person, place, and time: normal [de-identified] : L. neck level I feels improved, no other LAD. [FreeTextEntry1] : Significant improvement in the primary disease with great decrease in size of the lesion but possible residual disease along the gingivolabial sulcus on the left maxilla vs fibrosis from treatment?  No other lesions. [de-identified] : No other LAD.

## 2019-10-23 ENCOUNTER — OTHER (OUTPATIENT)
Age: 79
End: 2019-10-23

## 2019-10-23 VITALS — WEIGHT: 190 LBS | HEIGHT: 66 IN | RESPIRATION RATE: 16 BRPM | BODY MASS INDEX: 30.53 KG/M2

## 2019-10-23 PROCEDURE — 77387B: CUSTOM | Mod: 26

## 2019-10-24 PROCEDURE — 77014: CPT | Mod: 26

## 2019-10-24 NOTE — DISEASE MANAGEMENT
[Clinical] : TNM Stage: c [ROSE] : ROSE [TTNM] : 4a [NTNM] : 2b [MTNM] : 0 [de-identified] : 70 Gy [de-identified] : maxillary sinus

## 2019-10-24 NOTE — HISTORY OF PRESENT ILLNESS
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 \par \par h/o chemo for maxillary sinus SCC in 9/2019\par \par 4/35 fractions of radiation treatment today. No changes from baseline. No dysphagia, cough, SOB, neck pain, headache, dizziness. Will have blood test done once a week. Advised to f/u with surgeon to manage feeding tube\par

## 2019-10-25 ENCOUNTER — RESULT REVIEW (OUTPATIENT)
Age: 79
End: 2019-10-25

## 2019-10-25 ENCOUNTER — LABORATORY RESULT (OUTPATIENT)
Age: 79
End: 2019-10-25

## 2019-10-25 ENCOUNTER — APPOINTMENT (OUTPATIENT)
Dept: INFUSION THERAPY | Facility: HOSPITAL | Age: 79
End: 2019-10-25

## 2019-10-25 ENCOUNTER — APPOINTMENT (OUTPATIENT)
Dept: HEMATOLOGY ONCOLOGY | Facility: CLINIC | Age: 79
End: 2019-10-25
Payer: MEDICARE

## 2019-10-25 VITALS
DIASTOLIC BLOOD PRESSURE: 84 MMHG | BODY MASS INDEX: 30.71 KG/M2 | WEIGHT: 190.26 LBS | TEMPERATURE: 99.1 F | RESPIRATION RATE: 16 BRPM | SYSTOLIC BLOOD PRESSURE: 155 MMHG | HEART RATE: 68 BPM | OXYGEN SATURATION: 99 %

## 2019-10-25 LAB
BASOPHILS # BLD AUTO: 0 K/UL — SIGNIFICANT CHANGE UP (ref 0–0.2)
BASOPHILS NFR BLD AUTO: 0.3 % — SIGNIFICANT CHANGE UP (ref 0–2)
BUN SERPL-MCNC: 15 MG/DL — SIGNIFICANT CHANGE UP (ref 7–23)
CA-I BLDA-SCNC: 1.18 MMOL/L — SIGNIFICANT CHANGE UP (ref 1.12–1.3)
CHLORIDE SERPL-SCNC: 99 MMOL/L — SIGNIFICANT CHANGE UP (ref 96–108)
CO2 SERPL-SCNC: 29 MMOL/L — SIGNIFICANT CHANGE UP (ref 22–31)
CREAT SERPL-MCNC: 0.9 MG/DL — SIGNIFICANT CHANGE UP (ref 0.5–1.3)
EOSINOPHIL # BLD AUTO: 0.2 K/UL — SIGNIFICANT CHANGE UP (ref 0–0.5)
EOSINOPHIL NFR BLD AUTO: 2.5 % — SIGNIFICANT CHANGE UP (ref 0–6)
GLUCOSE SERPL-MCNC: 99 MG/DL — SIGNIFICANT CHANGE UP (ref 70–99)
HCT VFR BLD CALC: 39.8 % — SIGNIFICANT CHANGE UP (ref 39–50)
HGB BLD-MCNC: 13.3 G/DL — SIGNIFICANT CHANGE UP (ref 13–17)
LYMPHOCYTES # BLD AUTO: 0.6 K/UL — LOW (ref 1–3.3)
LYMPHOCYTES # BLD AUTO: 7.4 % — LOW (ref 13–44)
MCHC RBC-ENTMCNC: 31.4 PG — SIGNIFICANT CHANGE UP (ref 27–34)
MCHC RBC-ENTMCNC: 33.5 G/DL — SIGNIFICANT CHANGE UP (ref 32–36)
MCV RBC AUTO: 93.8 FL — SIGNIFICANT CHANGE UP (ref 80–100)
MONOCYTES # BLD AUTO: 1.1 K/UL — HIGH (ref 0–0.9)
MONOCYTES NFR BLD AUTO: 14.7 % — HIGH (ref 2–14)
NEUTROPHILS # BLD AUTO: 5.6 K/UL — SIGNIFICANT CHANGE UP (ref 1.8–7.4)
NEUTROPHILS NFR BLD AUTO: 75.1 % — SIGNIFICANT CHANGE UP (ref 43–77)
PLATELET # BLD AUTO: 423 K/UL — HIGH (ref 150–400)
POTASSIUM SERPL-MCNC: 4 MMOL/L — SIGNIFICANT CHANGE UP (ref 3.5–5.3)
POTASSIUM SERPL-SCNC: 4 MMOL/L — SIGNIFICANT CHANGE UP (ref 3.5–5.3)
RBC # BLD: 4.24 M/UL — SIGNIFICANT CHANGE UP (ref 4.2–5.8)
RBC # FLD: 15.1 % — HIGH (ref 10.3–14.5)
SODIUM SERPL-SCNC: 138 MMOL/L — SIGNIFICANT CHANGE UP (ref 135–145)
WBC # BLD: 7.5 K/UL — SIGNIFICANT CHANGE UP (ref 3.8–10.5)
WBC # FLD AUTO: 7.5 K/UL — SIGNIFICANT CHANGE UP (ref 3.8–10.5)

## 2019-10-25 PROCEDURE — 77427 RADIATION TX MANAGEMENT X5: CPT

## 2019-10-25 PROCEDURE — 99213 OFFICE O/P EST LOW 20 MIN: CPT

## 2019-10-25 PROCEDURE — 77387B: CUSTOM | Mod: 26

## 2019-10-25 NOTE — HISTORY OF PRESENT ILLNESS
[T: ___] : T[unfilled] [Disease: _____________________] : Disease: [unfilled] [N: ___] : N[unfilled] [M: ___] : M[unfilled] [AJCC Stage: ____] : AJCC Stage: [unfilled] [de-identified] : Mr. GORDON BROMBERG is a pleasant 79 year old man who comes here today while on radiation therapy. 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 As the bump started to grow, he sought medical help and had the work up done below. \par \par PET-CT 6/17/19\par 1. Hypermetabolic enhancing left upper gingival mass corresponds to patient's known squamous cell carcinoma. Please see report of contrast-enhanced CT of same day for further description of the primary lesion. \par \par 2. Nonspecific, asymmetric hypermetabolism in left nasopharynx and left greater than right base of tongue. Please correlate with direct visualization. \par \par 3. Hypermetabolic bilateral level IB and IIA cervical lymph nodes are compatible with metastatic disease. Ultrasound-guided percutaneous needle biopsy may be obtained for confirmation. \par \par 4. Non FDG-avid right upper lobe nodular opacity is nonspecific. Correlate clinically for infection. A dedicated CT of chest is recommended in one month for further evaluation. \par \par 5. Markedly enlarged prostate gland without associated hypermetabolism.\par \par CT neck 6/17/19\par Large plaque like enhancement abnormality centered along the left maxillary gingivobuccal sulcus extending into the left maxillary sinus and toward the left infraorbital canal, and left greater palatine foramen as described. Contrast-enhanced MR imaging of the skull base can be done to evaluate for possible perineural spread of disease. \par \par Contiguous versus possible synchronous additional malignant disease seen along the left nasopharynx, extending to the uvula. Left asymmetric lingual tonsillar enhancement extending to the vallecula which may represent tonsillar hypertrophy though additional neoplastic disease is not excluded. \par \par Bilateral pathologic level I, left II, bilateral III lymphadenopathy. \par \par MRI brain 7/2/19\par A large bulky mass with associated bony destruction is again noted involving the left maxillary alveolus, floor of the left maxillary sinus, and lower aspect of the posterior lateral wall of the left maxillary sinus, with extension to the left buccal space across the gingival buccal sulcus. The tumor extends into the lower retromaxillary fat. Tumor abuts and may involve the base of the pterygoid plates and greater-lesser palatine foramina, however the pterygopalatine fossa, infraorbital canal, Meckel's caves, and cavernous sinuses appear grossly spared. A component of the tumor may spread submucosally underneath the left hard palate. \par \par Nonspecific asymmetric enhancement involving the left nasopharynx, left soft palate, uvula, and left tonsil appears stable compared to the prior CT. Correlate with direct visualization findings to exclude mucosal or submucosal spread of tumor versus lymphoid hypertrophy. \par \par Heterogeneous bilateral level 1 and level 2 lymph nodes are again noted suspicious for pathologic lymphadenopathy, stable in size compared to the CT examination. 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 Bromberg is doing well. Main complaint is fatigue and dysgeusia. Some oral and lip pain, worse when eats, no radiation. All other ROS negative.

## 2019-10-25 NOTE — PHYSICAL EXAM
[Restricted in physically strenuous activity but ambulatory and able to carry out work of a light or sedentary nature] : Status 1- Restricted in physically strenuous activity but ambulatory and able to carry out work of a light or sedentary nature, e.g., light house work, office work [Obese] : obese [Normal] : full range of motion and no deformities appreciated [de-identified] : alopecia [de-identified] : no tumor is seen, no thrush, lip cracked, oral mucositis [de-identified] : ventral hernia, liver edge palpable, PEG in place

## 2019-10-25 NOTE — ASSESSMENT
[Palliative] : Goals of care discussed with patient: Palliative [FreeTextEntry1] : Mr. GORDON BROMBERG is a pleasant 79 year old man who comes here today after 3 cycles of pembro/carbo/taxol to treat  his diagnosis of maxillary sinus carcinoma- had a complete response. He is currently on radiation therapy.\par \par Dehydration- IV fluids weekly\par Dsygeusia- counselled\par Referred to nutrition\par Oral mucositis- gabapentin\par Hypothyroidism- on levothyroxine 75 mcg/day. \par Return to clinic end November\par \par There is no change in past medical history, social history or family history since our last visit. Cancer diagnosis does not affect other existing medical issues at the time.\par \par \par Cassia Campbell MD\par , [facility]\par [facility]\par [address]\[place], [place], [address]\par Tel: [phone]\par Fax: [phone]\par \par

## 2019-10-28 DIAGNOSIS — E86.0 DEHYDRATION: ICD-10-CM

## 2019-10-28 PROCEDURE — 77387B: CUSTOM | Mod: 26

## 2019-10-29 ENCOUNTER — LABORATORY RESULT (OUTPATIENT)
Age: 79
End: 2019-10-29

## 2019-10-29 ENCOUNTER — APPOINTMENT (OUTPATIENT)
Dept: SPEECH THERAPY | Facility: CLINIC | Age: 79
End: 2019-10-29
Payer: MEDICARE

## 2019-10-29 PROCEDURE — 92526 ORAL FUNCTION THERAPY: CPT | Mod: GN

## 2019-10-29 PROCEDURE — 77387B: CUSTOM | Mod: 26

## 2019-10-30 PROCEDURE — 77387B: CUSTOM | Mod: 26

## 2019-10-31 VITALS — BODY MASS INDEX: 29.73 KG/M2 | HEIGHT: 66 IN | WEIGHT: 185 LBS | RESPIRATION RATE: 16 BRPM

## 2019-10-31 PROCEDURE — 77014: CPT | Mod: 26

## 2019-10-31 NOTE — VITALS
[Maximal Pain Intensity: 2/10] : 2/10 [Least Pain Intensity: 0/10] : 0/10 [Pain Description/Quality: ___] : Pain description/quality: [unfilled] [Pain Duration: ___] : Pain duration: [unfilled] [Pain Location: ___] : Pain Location: [unfilled] [Adjuvant (neuroleptics)] : adjuvant (neuroleptics) [80: Normal activity with effort; some signs or symptoms of disease.] : 79: Normal activity with effort; some signs or symptoms of disease.  [ECOG Performance Status: 1 - Restricted in physically strenuous activity but ambulatory and able to carry out work of a light or sedentary nature] : Performance Status: 1 - Restricted in physically strenuous activity but ambulatory and able to carry out work of a light or sedentary nature, e.g., light house work, office work

## 2019-10-31 NOTE — REVIEW OF SYSTEMS
[Dysphagia: Grade 0] : Dysphagia: Grade 0 [Tinnitus - Grade 0] : Tinnitus - Grade 0 [Blurred Vision: Grade 0] : Blurred Vision: Grade 0 [Mucositis Oral: Grade 0] : Mucositis Oral: Grade 0  [Xerostomia: Grade 1 - Symptomatic (e.g., dry or thick saliva) without significant dietary alteration; unstimulated saliva flow >0.2 ml/min] : Xerostomia: Grade 1 - Symptomatic (e.g., dry or thick saliva) without significant dietary alteration; unstimulated saliva flow >0.2 ml/min [Oral Pain: Grade 0] : Oral Pain: Grade 0 [Salivary duct inflammation: Grade 0] : Salivary duct inflammation: Grade 0 [Dysgeusia: Grade 0] : Dysgeusia: Grade 0 [Alopecia: Grade 0] : Alopecia: Grade 0 [Pruritus: Grade 0] : Pruritus: Grade 0 [Skin Atrophy: Grade 0] : Skin Atrophy: Grade 0 [Skin Hyperpigmentation: Grade 1 - Hyperpigmentation covering <10% BSA; no psychosocial impact] : Skin Hyperpigmentation: Grade 1 - Hyperpigmentation covering <10% BSA; no psychosocial impact [Skin Induration: Grade 0] : Skin Induration: Grade 0 [Dermatitis Radiation: Grade 1 - Faint erythema or dry desquamation] : Dermatitis Radiation: Grade 1 - Faint erythema or dry desquamation

## 2019-11-01 ENCOUNTER — APPOINTMENT (OUTPATIENT)
Dept: INFUSION THERAPY | Facility: HOSPITAL | Age: 79
End: 2019-11-01

## 2019-11-01 PROCEDURE — 77427 RADIATION TX MANAGEMENT X5: CPT

## 2019-11-01 PROCEDURE — 77387B: CUSTOM | Mod: 26

## 2019-11-04 ENCOUNTER — OUTPATIENT (OUTPATIENT)
Dept: OUTPATIENT SERVICES | Facility: HOSPITAL | Age: 79
LOS: 1 days | Discharge: HOME | End: 2019-11-04

## 2019-11-04 DIAGNOSIS — C06.9 MALIGNANT NEOPLASM OF MOUTH, UNSPECIFIED: ICD-10-CM

## 2019-11-04 DIAGNOSIS — C76.0 MALIGNANT NEOPLASM OF HEAD, FACE AND NECK: ICD-10-CM

## 2019-11-04 PROCEDURE — 77387B: CUSTOM | Mod: 26

## 2019-11-05 ENCOUNTER — LABORATORY RESULT (OUTPATIENT)
Age: 79
End: 2019-11-05

## 2019-11-05 ENCOUNTER — OTHER (OUTPATIENT)
Age: 79
End: 2019-11-05

## 2019-11-05 ENCOUNTER — APPOINTMENT (OUTPATIENT)
Dept: SPEECH THERAPY | Facility: CLINIC | Age: 79
End: 2019-11-05
Payer: MEDICARE

## 2019-11-05 PROCEDURE — 92526 ORAL FUNCTION THERAPY: CPT | Mod: GN

## 2019-11-05 PROCEDURE — 77387B: CUSTOM | Mod: 26

## 2019-11-06 VITALS — BODY MASS INDEX: 30.53 KG/M2 | RESPIRATION RATE: 16 BRPM | HEIGHT: 66 IN | WEIGHT: 190 LBS

## 2019-11-06 PROCEDURE — 77387B: CUSTOM | Mod: 26

## 2019-11-07 PROCEDURE — 77014: CPT | Mod: 26

## 2019-11-08 ENCOUNTER — RESULT REVIEW (OUTPATIENT)
Age: 79
End: 2019-11-08

## 2019-11-08 ENCOUNTER — APPOINTMENT (OUTPATIENT)
Dept: INFUSION THERAPY | Facility: HOSPITAL | Age: 79
End: 2019-11-08

## 2019-11-08 ENCOUNTER — LABORATORY RESULT (OUTPATIENT)
Age: 79
End: 2019-11-08

## 2019-11-08 LAB
BASOPHILS # BLD AUTO: 0 K/UL — SIGNIFICANT CHANGE UP (ref 0–0.2)
BASOPHILS NFR BLD AUTO: 0.2 % — SIGNIFICANT CHANGE UP (ref 0–2)
BUN SERPL-MCNC: 20 MG/DL — SIGNIFICANT CHANGE UP (ref 7–23)
CA-I BLDA-SCNC: 1.14 MMOL/L — SIGNIFICANT CHANGE UP (ref 1.12–1.3)
CHLORIDE SERPL-SCNC: 97 MMOL/L — SIGNIFICANT CHANGE UP (ref 96–108)
CO2 SERPL-SCNC: 27 MMOL/L — SIGNIFICANT CHANGE UP (ref 22–31)
CREAT SERPL-MCNC: 0.9 MG/DL — SIGNIFICANT CHANGE UP (ref 0.5–1.3)
EOSINOPHIL # BLD AUTO: 0.4 K/UL — SIGNIFICANT CHANGE UP (ref 0–0.5)
EOSINOPHIL NFR BLD AUTO: 4.7 % — SIGNIFICANT CHANGE UP (ref 0–6)
GLUCOSE SERPL-MCNC: 136 MG/DL — HIGH (ref 70–99)
HCT VFR BLD CALC: 39.9 % — SIGNIFICANT CHANGE UP (ref 39–50)
HGB BLD-MCNC: 13.8 G/DL — SIGNIFICANT CHANGE UP (ref 13–17)
LYMPHOCYTES # BLD AUTO: 0.3 K/UL — LOW (ref 1–3.3)
LYMPHOCYTES # BLD AUTO: 4 % — LOW (ref 13–44)
MCHC RBC-ENTMCNC: 32.2 PG — SIGNIFICANT CHANGE UP (ref 27–34)
MCHC RBC-ENTMCNC: 34.5 G/DL — SIGNIFICANT CHANGE UP (ref 32–36)
MCV RBC AUTO: 93.5 FL — SIGNIFICANT CHANGE UP (ref 80–100)
MONOCYTES # BLD AUTO: 0.8 K/UL — SIGNIFICANT CHANGE UP (ref 0–0.9)
MONOCYTES NFR BLD AUTO: 9.9 % — SIGNIFICANT CHANGE UP (ref 2–14)
NEUTROPHILS # BLD AUTO: 6.2 K/UL — SIGNIFICANT CHANGE UP (ref 1.8–7.4)
NEUTROPHILS NFR BLD AUTO: 81.1 % — HIGH (ref 43–77)
PLATELET # BLD AUTO: 388 K/UL — SIGNIFICANT CHANGE UP (ref 150–400)
POTASSIUM SERPL-MCNC: 5 MMOL/L — SIGNIFICANT CHANGE UP (ref 3.5–5.3)
POTASSIUM SERPL-SCNC: 5 MMOL/L — SIGNIFICANT CHANGE UP (ref 3.5–5.3)
RBC # BLD: 4.27 M/UL — SIGNIFICANT CHANGE UP (ref 4.2–5.8)
RBC # FLD: 13.4 % — SIGNIFICANT CHANGE UP (ref 10.3–14.5)
SODIUM SERPL-SCNC: 134 MMOL/L — LOW (ref 135–145)
WBC # BLD: 7.7 K/UL — SIGNIFICANT CHANGE UP (ref 3.8–10.5)
WBC # FLD AUTO: 7.7 K/UL — SIGNIFICANT CHANGE UP (ref 3.8–10.5)

## 2019-11-08 PROCEDURE — 77427 RADIATION TX MANAGEMENT X5: CPT

## 2019-11-08 PROCEDURE — 77387B: CUSTOM | Mod: 26

## 2019-11-10 LAB
ALBUMIN SERPL ELPH-MCNC: 4.3 G/DL
ALP BLD-CCNC: 68 U/L
ALT SERPL-CCNC: 15 U/L
ANION GAP SERPL CALC-SCNC: 12 MMOL/L
AST SERPL-CCNC: 20 U/L
BASOPHILS # BLD AUTO: 0.04 K/UL
BASOPHILS NFR BLD AUTO: 0.6 %
BILIRUB SERPL-MCNC: 0.3 MG/DL
BUN SERPL-MCNC: 13 MG/DL
CALCIUM SERPL-MCNC: 9.6 MG/DL
CHLORIDE SERPL-SCNC: 100 MMOL/L
CO2 SERPL-SCNC: 26 MMOL/L
CREAT SERPL-MCNC: 1.07 MG/DL
EOSINOPHIL # BLD AUTO: 0.15 K/UL
EOSINOPHIL NFR BLD AUTO: 2.4 %
GLUCOSE SERPL-MCNC: 107 MG/DL
HCT VFR BLD CALC: 43.6 %
HGB BLD-MCNC: 13.5 G/DL
IMM GRANULOCYTES NFR BLD AUTO: 1.1 %
LYMPHOCYTES # BLD AUTO: 0.79 K/UL
LYMPHOCYTES NFR BLD AUTO: 12.6 %
MAN DIFF?: NORMAL
MCHC RBC-ENTMCNC: 30.3 PG
MCHC RBC-ENTMCNC: 31 GM/DL
MCV RBC AUTO: 97.8 FL
MONOCYTES # BLD AUTO: 1.01 K/UL
MONOCYTES NFR BLD AUTO: 16.1 %
NEUTROPHILS # BLD AUTO: 4.2 K/UL
NEUTROPHILS NFR BLD AUTO: 67.2 %
PLATELET # BLD AUTO: 438 K/UL
POTASSIUM SERPL-SCNC: 4.2 MMOL/L
PROT SERPL-MCNC: 7.6 G/DL
RBC # BLD: 4.46 M/UL
RBC # FLD: 16.1 %
SODIUM SERPL-SCNC: 138 MMOL/L
WBC # FLD AUTO: 6.26 K/UL

## 2019-11-10 NOTE — DISEASE MANAGEMENT
[Clinical] : TNM Stage: c [ROSE] : ROSE [TTNM] : 4a [NTNM] : 2b [MTNM] : 0 [de-identified] : 70 Gy [de-identified] : maxillary sinus

## 2019-11-10 NOTE — HISTORY OF PRESENT ILLNESS
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 \par \par h/o chemo for maxillary sinus SCC in 9/2019\par \par 14/35 fractions of radiation treatment today. c/o mouth pain at times. On gabapentin from Med Onc. No dysphagia, cough, SOB, neck pain, headache, dizziness. have blood test done once a week. Advised to f/u with surgeon to manage feeding tube, but he has not used feeding tube yet\par

## 2019-11-10 NOTE — DISEASE MANAGEMENT
[Clinical] : TNM Stage: c [ROSE] : ROSE [TTNM] : 4a [NTNM] : 2b [MTNM] : 0 [de-identified] : 70 Gy [de-identified] : maxillary sinus

## 2019-11-10 NOTE — DISEASE MANAGEMENT
[Clinical] : TNM Stage: c [ROSE] : ROSE [TTNM] : 4a [NTNM] : 2b [MTNM] : 0 [de-identified] : 70 Gy [de-identified] : maxillary sinus

## 2019-11-10 NOTE — HISTORY OF PRESENT ILLNESS
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 \par \par h/o chemo for maxillary sinus SCC in 9/2019\par \par 9/35 fractions of radiation treatment today. No changes from baseline. No dysphagia, cough, SOB, neck pain, headache, dizziness. Will have blood test done once a week. Advised to f/u with surgeon to manage feeding tube, but he has not used feeding tube yet\par

## 2019-11-10 NOTE — REVIEW OF SYSTEMS
[Tinnitus - Grade 0] : Tinnitus - Grade 0 [Blurred Vision: Grade 0] : Blurred Vision: Grade 0 [Mucositis Oral: Grade 0] : Mucositis Oral: Grade 0  [Xerostomia: Grade 1 - Symptomatic (e.g., dry or thick saliva) without significant dietary alteration; unstimulated saliva flow >0.2 ml/min] : Xerostomia: Grade 1 - Symptomatic (e.g., dry or thick saliva) without significant dietary alteration; unstimulated saliva flow >0.2 ml/min [Oral Pain: Grade 0] : Oral Pain: Grade 0 [Salivary duct inflammation: Grade 0] : Salivary duct inflammation: Grade 0 [Dysgeusia: Grade 0] : Dysgeusia: Grade 0 [Alopecia: Grade 0] : Alopecia: Grade 0 [Pruritus: Grade 0] : Pruritus: Grade 0 [Skin Atrophy: Grade 0] : Skin Atrophy: Grade 0 [Skin Hyperpigmentation: Grade 2 - Hyperpigmentation covering >10% BSA; associated psychosocial impact] : Skin Hyperpigmentation: Grade 2 - Hyperpigmentation covering >10% BSA; associated psychosocial impact [Skin Induration: Grade 0] : Skin Induration: Grade 0 [Dermatitis Radiation: Grade 2 - Moderate to brisk erythema; patchy moist desquamation, mostly confined to skin folds and creases; moderate edema] : Dermatitis Radiation: Grade 2 - Moderate to brisk erythema; patchy moist desquamation, mostly confined to skin folds and creases; moderate edema [FreeTextEntry6] : on feeding tube

## 2019-11-10 NOTE — HISTORY OF PRESENT ILLNESS
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 \par \par h/o chemo for maxillary sinus SCC in 9/2019\par \par 19/35 fractions of radiation treatment today. c/o mouth pain at times. On gabapentin from Med Onc. No dysphagia, cough, SOB, neck pain, headache, dizziness. have blood test done once a week. Advised to f/u with surgeon to manage feeding tube, using feeding tube. Advised to use Aquaphor over RT area. \par

## 2019-11-10 NOTE — DISEASE MANAGEMENT
[Clinical] : TNM Stage: c [ROSE] : ROSE [TTNM] : 4a [NTNM] : 2b [MTNM] : 0 [de-identified] : 70 Gy [de-identified] : maxillary sinus

## 2019-11-10 NOTE — HISTORY OF PRESENT ILLNESS
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 \par \par h/o chemo for maxillary sinus SCC in 9/2019\par \par 19/35 fractions of radiation treatment today. c/o mouth pain at times. On gabapentin from Med Onc. No dysphagia, cough, SOB, neck pain, headache, dizziness. have blood test done once a week. Advised to f/u with surgeon to manage feeding tube, using feeding tube. Advised to use Aquaphor over RT area. \par

## 2019-11-11 DIAGNOSIS — E86.0 DEHYDRATION: ICD-10-CM

## 2019-11-11 PROCEDURE — 77387B: CUSTOM | Mod: 26

## 2019-11-12 ENCOUNTER — LABORATORY RESULT (OUTPATIENT)
Age: 79
End: 2019-11-12

## 2019-11-12 ENCOUNTER — APPOINTMENT (OUTPATIENT)
Dept: SPEECH THERAPY | Facility: CLINIC | Age: 79
End: 2019-11-12

## 2019-11-12 ENCOUNTER — OTHER (OUTPATIENT)
Age: 79
End: 2019-11-12

## 2019-11-12 PROCEDURE — 77387B: CUSTOM | Mod: 26

## 2019-11-13 PROCEDURE — 77387B: CUSTOM | Mod: 26

## 2019-11-14 ENCOUNTER — APPOINTMENT (OUTPATIENT)
Dept: INFUSION THERAPY | Facility: HOSPITAL | Age: 79
End: 2019-11-14

## 2019-11-14 VITALS
TEMPERATURE: 98.6 F | OXYGEN SATURATION: 97 % | WEIGHT: 189.7 LBS | RESPIRATION RATE: 16 BRPM | BODY MASS INDEX: 30.62 KG/M2 | SYSTOLIC BLOOD PRESSURE: 134 MMHG | DIASTOLIC BLOOD PRESSURE: 76 MMHG | HEART RATE: 75 BPM

## 2019-11-14 PROCEDURE — 77014: CPT | Mod: 26

## 2019-11-15 ENCOUNTER — APPOINTMENT (OUTPATIENT)
Dept: HEMATOLOGY ONCOLOGY | Facility: CLINIC | Age: 79
End: 2019-11-15
Payer: MEDICARE

## 2019-11-15 ENCOUNTER — APPOINTMENT (OUTPATIENT)
Dept: SPEECH THERAPY | Facility: CLINIC | Age: 79
End: 2019-11-15

## 2019-11-15 VITALS
BODY MASS INDEX: 30.6 KG/M2 | WEIGHT: 189.58 LBS | HEART RATE: 66 BPM | TEMPERATURE: 98.5 F | DIASTOLIC BLOOD PRESSURE: 85 MMHG | SYSTOLIC BLOOD PRESSURE: 154 MMHG | RESPIRATION RATE: 18 BRPM | OXYGEN SATURATION: 98 %

## 2019-11-15 PROCEDURE — 99213 OFFICE O/P EST LOW 20 MIN: CPT

## 2019-11-15 PROCEDURE — 77427 RADIATION TX MANAGEMENT X5: CPT

## 2019-11-15 PROCEDURE — 77387B: CUSTOM | Mod: 26

## 2019-11-15 NOTE — ASSESSMENT
[Palliative] : Goals of care discussed with patient: Palliative [FreeTextEntry1] : Mr. GORDON BROMBERG is a pleasant 79 year old man who comes here today after 3 cycles of pembro/carbo/taxol to treat  his diagnosis of maxillary sinus carcinoma- had a complete response. He is currently on radiation therapy.\par \par Dehydration- IV fluids weekly\par Dysgeusia and dry mouth- counselled\par Referred to nutrition\par Oral mucositis- gabapentin\par Hypothyroidism- on levothyroxine 75 mcg/day. \par \par There is no change in past medical history, social history or family history since our last visit. Cancer diagnosis does not affect other existing medical issues at the time.\par \par \par Cassia Campbell MD\par , [facility]\par [facility]\par [address]\par [place], [place], [address]\par Tel: [phone]\par Fax: [phone]\par \par

## 2019-11-15 NOTE — PHYSICAL EXAM
[Restricted in physically strenuous activity but ambulatory and able to carry out work of a light or sedentary nature] : Status 1- Restricted in physically strenuous activity but ambulatory and able to carry out work of a light or sedentary nature, e.g., light house work, office work [Obese] : obese [Normal] : affect appropriate [de-identified] : no tumor is seen, no thrush, lip cracked, oral mucositis [de-identified] : ventral hernia, liver edge palpable, PEG in place

## 2019-11-15 NOTE — HISTORY OF PRESENT ILLNESS
[Disease: _____________________] : Disease: [unfilled] [T: ___] : T[unfilled] [N: ___] : N[unfilled] [M: ___] : M[unfilled] [AJCC Stage: ____] : AJCC Stage: [unfilled] [de-identified] : Mr. GORDON BROMBERG is a pleasant 79 year old man who comes here today while on radiation therapy. 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 As the bump started to grow, he sought medical help and had the work up done below. \par \par PET-CT 6/17/19\par 1. Hypermetabolic enhancing left upper gingival mass corresponds to patient's known squamous cell carcinoma. Please see report of contrast-enhanced CT of same day for further description of the primary lesion. \par \par 2. Nonspecific, asymmetric hypermetabolism in left nasopharynx and left greater than right base of tongue. Please correlate with direct visualization. \par \par 3. Hypermetabolic bilateral level IB and IIA cervical lymph nodes are compatible with metastatic disease. Ultrasound-guided percutaneous needle biopsy may be obtained for confirmation. \par \par 4. Non FDG-avid right upper lobe nodular opacity is nonspecific. Correlate clinically for infection. A dedicated CT of chest is recommended in one month for further evaluation. \par \par 5. Markedly enlarged prostate gland without associated hypermetabolism.\par \par CT neck 6/17/19\par Large plaque like enhancement abnormality centered along the left maxillary gingivobuccal sulcus extending into the left maxillary sinus and toward the left infraorbital canal, and left greater palatine foramen as described. Contrast-enhanced MR imaging of the skull base can be done to evaluate for possible perineural spread of disease. \par \par Contiguous versus possible synchronous additional malignant disease seen along the left nasopharynx, extending to the uvula. Left asymmetric lingual tonsillar enhancement extending to the vallecula which may represent tonsillar hypertrophy though additional neoplastic disease is not excluded. \par \par Bilateral pathologic level I, left II, bilateral III lymphadenopathy. \par \par MRI brain 7/2/19\par A large bulky mass with associated bony destruction is again noted involving the left maxillary alveolus, floor of the left maxillary sinus, and lower aspect of the posterior lateral wall of the left maxillary sinus, with extension to the left buccal space across the gingival buccal sulcus. The tumor extends into the lower retromaxillary fat. Tumor abuts and may involve the base of the pterygoid plates and greater-lesser palatine foramina, however the pterygopalatine fossa, infraorbital canal, Meckel's caves, and cavernous sinuses appear grossly spared. A component of the tumor may spread submucosally underneath the left hard palate. \par \par Nonspecific asymmetric enhancement involving the left nasopharynx, left soft palate, uvula, and left tonsil appears stable compared to the prior CT. Correlate with direct visualization findings to exclude mucosal or submucosal spread of tumor versus lymphoid hypertrophy. \par \par Heterogeneous bilateral level 1 and level 2 lymph nodes are again noted suspicious for pathologic lymphadenopathy, stable in size compared to the CT examination. 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 Bromberg is doing reasonably well. Eating soft and liquid food, dysgeusia is present. Some oral and lip pain, worse when eats, pain does not radiate, and is partially relieved by gabapentin. All other ROS negative.

## 2019-11-18 PROCEDURE — 77387B: CUSTOM | Mod: 26

## 2019-11-19 ENCOUNTER — LABORATORY RESULT (OUTPATIENT)
Age: 79
End: 2019-11-19

## 2019-11-19 PROCEDURE — 77387B: CUSTOM | Mod: 26

## 2019-11-20 ENCOUNTER — OTHER (OUTPATIENT)
Age: 79
End: 2019-11-20

## 2019-11-20 VITALS — HEIGHT: 66 IN | RESPIRATION RATE: 16 BRPM | WEIGHT: 189 LBS | BODY MASS INDEX: 30.37 KG/M2

## 2019-11-20 PROCEDURE — 77387B: CUSTOM | Mod: 26

## 2019-11-21 ENCOUNTER — APPOINTMENT (OUTPATIENT)
Dept: INFUSION THERAPY | Facility: HOSPITAL | Age: 79
End: 2019-11-21

## 2019-11-21 PROCEDURE — 77387B: CUSTOM | Mod: 26

## 2019-11-22 PROCEDURE — 77014: CPT | Mod: 26

## 2019-11-22 NOTE — HISTORY OF PRESENT ILLNESS
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 \par \par h/o chemo for maxillary sinus SCC in 9/2019\par \par 25/35 fractions of radiation treatment today. c/o mouth pain at times, and dry mouth. No taste.  On gabapentin from Med Onc. No dysphagia, cough, SOB, neck pain, headache, dizziness. have blood test done once a week.  Not using feeding tube yet. Advised to use Aquaphor over RT area. weight stable. Also using some OTC mouthwash. \par

## 2019-11-25 ENCOUNTER — TRANSCRIPTION ENCOUNTER (OUTPATIENT)
Age: 79
End: 2019-11-25

## 2019-11-25 PROCEDURE — 77387B: CUSTOM | Mod: 26

## 2019-11-26 ENCOUNTER — APPOINTMENT (OUTPATIENT)
Dept: THORACIC SURGERY | Facility: HOSPITAL | Age: 79
End: 2019-11-26

## 2019-11-26 ENCOUNTER — OUTPATIENT (OUTPATIENT)
Dept: OUTPATIENT SERVICES | Facility: HOSPITAL | Age: 79
LOS: 1 days | Discharge: HOME | End: 2019-11-26
Payer: MEDICARE

## 2019-11-26 ENCOUNTER — APPOINTMENT (OUTPATIENT)
Dept: INFUSION THERAPY | Facility: HOSPITAL | Age: 79
End: 2019-11-26

## 2019-11-26 VITALS
WEIGHT: 190.04 LBS | OXYGEN SATURATION: 96 % | HEART RATE: 64 BPM | TEMPERATURE: 98 F | SYSTOLIC BLOOD PRESSURE: 131 MMHG | HEIGHT: 66 IN | RESPIRATION RATE: 16 BRPM | DIASTOLIC BLOOD PRESSURE: 61 MMHG

## 2019-11-26 VITALS — HEART RATE: 66 BPM | RESPIRATION RATE: 16 BRPM | DIASTOLIC BLOOD PRESSURE: 69 MMHG | SYSTOLIC BLOOD PRESSURE: 137 MMHG

## 2019-11-26 DIAGNOSIS — Z92.21 PERSONAL HISTORY OF ANTINEOPLASTIC CHEMOTHERAPY: Chronic | ICD-10-CM

## 2019-11-26 DIAGNOSIS — C03.0 MALIGNANT NEOPLASM OF UPPER GUM: ICD-10-CM

## 2019-11-26 DIAGNOSIS — Z92.3 PERSONAL HISTORY OF IRRADIATION: Chronic | ICD-10-CM

## 2019-11-26 PROCEDURE — 77387B: CUSTOM | Mod: 26

## 2019-11-26 PROCEDURE — 43247 EGD REMOVE FOREIGN BODY: CPT

## 2019-11-26 RX ORDER — SODIUM CHLORIDE 9 MG/ML
1000 INJECTION, SOLUTION INTRAVENOUS
Refills: 0 | Status: DISCONTINUED | OUTPATIENT
Start: 2019-11-26 | End: 2019-12-18

## 2019-11-26 RX ADMIN — SODIUM CHLORIDE 30 MILLILITER(S): 9 INJECTION, SOLUTION INTRAVENOUS at 08:01

## 2019-11-26 NOTE — BRIEF OPERATIVE NOTE - NSICDXBRIEFPROCEDURE_GEN_ALL_CORE_FT
PROCEDURES:  EGD 26-Nov-2019 08:52:28  Juan Mendosa  Removal, PEG tube 26-Nov-2019 08:52:20  Juan Mendosa

## 2019-11-27 ENCOUNTER — OTHER (OUTPATIENT)
Age: 79
End: 2019-11-27

## 2019-11-27 VITALS
TEMPERATURE: 97.9 F | OXYGEN SATURATION: 96 % | DIASTOLIC BLOOD PRESSURE: 80 MMHG | SYSTOLIC BLOOD PRESSURE: 162 MMHG | RESPIRATION RATE: 17 BRPM | WEIGHT: 180.89 LBS | BODY MASS INDEX: 29.2 KG/M2 | HEART RATE: 76 BPM

## 2019-11-27 PROCEDURE — 77387B: CUSTOM | Mod: 26

## 2019-11-27 NOTE — REVIEW OF SYSTEMS
[Dysphagia: Grade 1 - Symptomatic, able to eat regular diet] : Dysphagia: Grade 1 - Symptomatic, able to eat regular diet [Tinnitus - Grade 0] : Tinnitus - Grade 0 [Blurred Vision: Grade 0] : Blurred Vision: Grade 0 [Mucositis Oral: Grade 0] : Mucositis Oral: Grade 0  [Xerostomia: Grade 2 - Moderate symptoms; oral intake alterations (e.g., copious water, other lubricants, diet limited to purees and/or soft, moist foods); unstimulated saliva 0.1 to 0.2 ml/min] : Xerostomia: Grade 2 - Moderate symptoms; oral intake alterations (e.g., copious water, other lubricants, diet limited to purees and/or soft, moist foods); unstimulated saliva 0.1 to 0.2 ml/min [Oral Pain: Grade 0] : Oral Pain: Grade 0 [Salivary duct inflammation: Grade 0] : Salivary duct inflammation: Grade 0 [Dysgeusia: Grade 2 - Altered taste with change in diet (e.g., oral supplements); noxious or unpleasant taste; loss of taste] : Dysgeusia: Grade 2 - Altered taste with change in diet (e.g., oral supplements); noxious or unpleasant taste; loss of taste [Hoarseness: Grade 0] : Hoarseness: Grade 0 [Alopecia: Grade 0] : Alopecia: Grade 0 [Pruritus: Grade 0] : Pruritus: Grade 0 [Skin Atrophy: Grade 0] : Skin Atrophy: Grade 0 [Skin Hyperpigmentation: Grade 2 - Hyperpigmentation covering >10% BSA; associated psychosocial impact] : Skin Hyperpigmentation: Grade 2 - Hyperpigmentation covering >10% BSA; associated psychosocial impact [Skin Induration: Grade 0] : Skin Induration: Grade 0 [Dermatitis Radiation: Grade 2 - Moderate to brisk erythema; patchy moist desquamation, mostly confined to skin folds and creases; moderate edema] : Dermatitis Radiation: Grade 2 - Moderate to brisk erythema; patchy moist desquamation, mostly confined to skin folds and creases; moderate edema [FreeTextEntry6] : on feeding tube

## 2019-11-27 NOTE — HISTORY OF PRESENT ILLNESS
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 \par \par h/o chemo for maxillary sinus SCC in 9/2019\par \par 29/35 fractions of radiation treatment today. c/o mouth pain at times, and dry mouth. No taste.  On gabapentin from Med Onc. No dysphagia, cough, SOB, neck pain, headache, dizziness. have blood test done once a week.  Not using feeding tube yet. Advised to use Aquaphor over RT area. weight stable. Also using some OTC mouthwash Prevention. \par

## 2019-11-27 NOTE — VITALS
[Maximal Pain Intensity: 2/10] : 2/10 [Least Pain Intensity: 0/10] : 0/10 [Pain Description/Quality: ___] : Pain description/quality: [unfilled] [Pain Duration: ___] : Pain duration: [unfilled] [Pain Location: ___] : Pain Location: [unfilled] [Pain Interferes with ADLs] : Pain interferes with activities of daily living. [Adjuvant (neuroleptics)] : adjuvant (neuroleptics) [80: Normal activity with effort; some signs or symptoms of disease.] : 80: Normal activity with effort; some signs or symptoms of disease.  [ECOG Performance Status: 1 - Restricted in physically strenuous activity but ambulatory and able to carry out work of a light or sedentary nature] : Performance Status: 1 - Restricted in physically strenuous activity but ambulatory and able to carry out work of a light or sedentary nature, e.g., light house work, office work

## 2019-11-27 NOTE — DISEASE MANAGEMENT
[Clinical] : TNM Stage: c [ROSE] : ROSE [TTNM] : 4a [NTNM] : 2b [MTNM] : 0 [de-identified] : 70 Gy [de-identified] : maxillary sinus

## 2019-11-29 ENCOUNTER — APPOINTMENT (OUTPATIENT)
Dept: INFUSION THERAPY | Facility: HOSPITAL | Age: 79
End: 2019-11-29

## 2019-11-29 PROCEDURE — 77387B: CUSTOM | Mod: 26

## 2019-12-03 ENCOUNTER — RESULT REVIEW (OUTPATIENT)
Age: 79
End: 2019-12-03

## 2019-12-03 ENCOUNTER — APPOINTMENT (OUTPATIENT)
Dept: INFUSION THERAPY | Facility: HOSPITAL | Age: 79
End: 2019-12-03

## 2019-12-03 ENCOUNTER — LABORATORY RESULT (OUTPATIENT)
Age: 79
End: 2019-12-03

## 2019-12-03 ENCOUNTER — APPOINTMENT (OUTPATIENT)
Dept: RADIATION ONCOLOGY | Facility: CLINIC | Age: 79
End: 2019-12-03
Payer: MEDICARE

## 2019-12-03 VITALS
HEIGHT: 66 IN | SYSTOLIC BLOOD PRESSURE: 150 MMHG | WEIGHT: 183.18 LBS | RESPIRATION RATE: 18 BRPM | TEMPERATURE: 99.3 F | DIASTOLIC BLOOD PRESSURE: 81 MMHG | HEART RATE: 92 BPM | OXYGEN SATURATION: 96 % | BODY MASS INDEX: 29.44 KG/M2

## 2019-12-03 LAB
BASOPHILS # BLD AUTO: 0.1 K/UL — SIGNIFICANT CHANGE UP (ref 0–0.2)
BASOPHILS NFR BLD AUTO: 1.2 % — SIGNIFICANT CHANGE UP (ref 0–2)
EOSINOPHIL # BLD AUTO: 1 K/UL — HIGH (ref 0–0.5)
EOSINOPHIL NFR BLD AUTO: 11.7 % — HIGH (ref 0–6)
HCT VFR BLD CALC: 36.8 % — LOW (ref 39–50)
HGB BLD-MCNC: 12.3 G/DL — LOW (ref 13–17)
LYMPHOCYTES # BLD AUTO: 0.3 K/UL — LOW (ref 1–3.3)
LYMPHOCYTES # BLD AUTO: 3 % — LOW (ref 13–44)
MCHC RBC-ENTMCNC: 31.9 PG — SIGNIFICANT CHANGE UP (ref 27–34)
MCHC RBC-ENTMCNC: 33.5 G/DL — SIGNIFICANT CHANGE UP (ref 32–36)
MCV RBC AUTO: 95.3 FL — SIGNIFICANT CHANGE UP (ref 80–100)
MONOCYTES # BLD AUTO: 1.2 K/UL — HIGH (ref 0–0.9)
MONOCYTES NFR BLD AUTO: 14 % — SIGNIFICANT CHANGE UP (ref 2–14)
NEUTROPHILS # BLD AUTO: 6.1 K/UL — SIGNIFICANT CHANGE UP (ref 1.8–7.4)
NEUTROPHILS NFR BLD AUTO: 70.1 % — SIGNIFICANT CHANGE UP (ref 43–77)
PLATELET # BLD AUTO: 396 K/UL — SIGNIFICANT CHANGE UP (ref 150–400)
RBC # BLD: 3.87 M/UL — LOW (ref 4.2–5.8)
RBC # FLD: 13.2 % — SIGNIFICANT CHANGE UP (ref 10.3–14.5)
WBC # BLD: 8.6 K/UL — SIGNIFICANT CHANGE UP (ref 3.8–10.5)
WBC # FLD AUTO: 8.6 K/UL — SIGNIFICANT CHANGE UP (ref 3.8–10.5)

## 2019-12-03 PROCEDURE — 99024 POSTOP FOLLOW-UP VISIT: CPT

## 2019-12-03 NOTE — REASON FOR VISIT
[Head and Neck Cancer] : head and neck cancer [Post-Treatment Evaluation] : post-treatment evaluation for [Spouse] : spouse

## 2019-12-03 NOTE — REVIEW OF SYSTEMS
[Dysphagia] : dysphagia [Odynophagia] : odynophagia [Mucosal Pain] : mucosal pain [Negative] : Allergic/Immunologic [Dysphagia: Grade 2 - Symptomatic and altered eating/swallowing] : Dysphagia: Grade 2 - Symptomatic and altered eating/swallowing [Nausea: Grade 0] : Nausea: Grade 0 [Tinnitus - Grade 0] : Tinnitus - Grade 0 [Blurred Vision: Grade 0] : Blurred Vision: Grade 0 [Mucositis Oral: Grade 2 - Moderate pain; not interfering with oral intake; modified diet indicated] : Mucositis Oral: Grade 2 - Moderate pain; not interfering with oral intake; modified diet indicated [Xerostomia: Grade 2 - Moderate symptoms; oral intake alterations (e.g., copious water, other lubricants, diet limited to purees and/or soft, moist foods); unstimulated saliva 0.1 to 0.2 ml/min] : Xerostomia: Grade 2 - Moderate symptoms; oral intake alterations (e.g., copious water, other lubricants, diet limited to purees and/or soft, moist foods); unstimulated saliva 0.1 to 0.2 ml/min [Oral Pain: Grade 2 - Moderate pain; limiting instrumental ADL] : Oral Pain: Grade 2 - Moderate pain; limiting instrumental ADL [Salivary duct inflammation: Grade 2 - Thick, ropy, sticky saliva; markedly altered taste; alteration in diet indicated; secretion-induced symptoms; limiting instrumental ADL] : Salivary duct inflammation: Grade 2 - Thick, ropy, sticky saliva; markedly altered taste; alteration in diet indicated; secretion-induced symptoms; limiting instrumental ADL [Dysgeusia: Grade 2 - Altered taste with change in diet (e.g., oral supplements); noxious or unpleasant taste; loss of taste] : Dysgeusia: Grade 2 - Altered taste with change in diet (e.g., oral supplements); noxious or unpleasant taste; loss of taste [Hoarseness: Grade 0] : Hoarseness: Grade 0 [Alopecia: Grade 0] : Alopecia: Grade 0 [Pruritus: Grade 0] : Pruritus: Grade 0 [Skin Atrophy: Grade 0] : Skin Atrophy: Grade 0 [Skin Induration: Grade 0] : Skin Induration: Grade 0 [Skin Hyperpigmentation: Grade 2 - Hyperpigmentation covering >10% BSA; associated psychosocial impact] : Skin Hyperpigmentation: Grade 2 - Hyperpigmentation covering >10% BSA; associated psychosocial impact [Dermatitis Radiation: Grade 2 - Moderate to brisk erythema; patchy moist desquamation, mostly confined to skin folds and creases; moderate edema] : Dermatitis Radiation: Grade 2 - Moderate to brisk erythema; patchy moist desquamation, mostly confined to skin folds and creases; moderate edema

## 2019-12-04 ENCOUNTER — OUTPATIENT (OUTPATIENT)
Dept: OUTPATIENT SERVICES | Facility: HOSPITAL | Age: 79
LOS: 1 days | Discharge: HOME | End: 2019-12-04

## 2019-12-04 DIAGNOSIS — C76.0 MALIGNANT NEOPLASM OF HEAD, FACE AND NECK: ICD-10-CM

## 2019-12-04 DIAGNOSIS — Z92.3 PERSONAL HISTORY OF IRRADIATION: Chronic | ICD-10-CM

## 2019-12-04 DIAGNOSIS — Z92.21 PERSONAL HISTORY OF ANTINEOPLASTIC CHEMOTHERAPY: Chronic | ICD-10-CM

## 2019-12-04 DIAGNOSIS — C06.9 MALIGNANT NEOPLASM OF MOUTH, UNSPECIFIED: ICD-10-CM

## 2019-12-06 ENCOUNTER — APPOINTMENT (OUTPATIENT)
Dept: HEMATOLOGY ONCOLOGY | Facility: CLINIC | Age: 79
End: 2019-12-06
Payer: MEDICARE

## 2019-12-06 VITALS
TEMPERATURE: 99.5 F | OXYGEN SATURATION: 95 % | HEART RATE: 76 BPM | DIASTOLIC BLOOD PRESSURE: 81 MMHG | WEIGHT: 183.2 LBS | BODY MASS INDEX: 29.57 KG/M2 | RESPIRATION RATE: 17 BRPM | SYSTOLIC BLOOD PRESSURE: 165 MMHG

## 2019-12-06 PROCEDURE — 99213 OFFICE O/P EST LOW 20 MIN: CPT

## 2019-12-06 NOTE — REVIEW OF SYSTEMS
[Dysphagia: Grade 1 - Symptomatic, able to eat regular diet] : Dysphagia: Grade 1 - Symptomatic, able to eat regular diet [Tinnitus - Grade 0] : Tinnitus - Grade 0 [Blurred Vision: Grade 0] : Blurred Vision: Grade 0 [Mucositis Oral: Grade 0] : Mucositis Oral: Grade 0  [Xerostomia: Grade 2 - Moderate symptoms; oral intake alterations (e.g., copious water, other lubricants, diet limited to purees and/or soft, moist foods); unstimulated saliva 0.1 to 0.2 ml/min] : Xerostomia: Grade 2 - Moderate symptoms; oral intake alterations (e.g., copious water, other lubricants, diet limited to purees and/or soft, moist foods); unstimulated saliva 0.1 to 0.2 ml/min [Oral Pain: Grade 0] : Oral Pain: Grade 0 [Salivary duct inflammation: Grade 0] : Salivary duct inflammation: Grade 0 [Dysgeusia: Grade 2 - Altered taste with change in diet (e.g., oral supplements); noxious or unpleasant taste; loss of taste] : Dysgeusia: Grade 2 - Altered taste with change in diet (e.g., oral supplements); noxious or unpleasant taste; loss of taste [Hoarseness: Grade 0] : Hoarseness: Grade 0 [Alopecia: Grade 0] : Alopecia: Grade 0 [Pruritus: Grade 0] : Pruritus: Grade 0 [Skin Atrophy: Grade 0] : Skin Atrophy: Grade 0 [Skin Hyperpigmentation: Grade 2 - Hyperpigmentation covering >10% BSA; associated psychosocial impact] : Skin Hyperpigmentation: Grade 2 - Hyperpigmentation covering >10% BSA; associated psychosocial impact [Skin Induration: Grade 0] : Skin Induration: Grade 0 [Dermatitis Radiation: Grade 2 - Moderate to brisk erythema; patchy moist desquamation, mostly confined to skin folds and creases; moderate edema] : Dermatitis Radiation: Grade 2 - Moderate to brisk erythema; patchy moist desquamation, mostly confined to skin folds and creases; moderate edema [FreeTextEntry6] : feeding tube out on 11/26/2019

## 2019-12-06 NOTE — DISEASE MANAGEMENT
[Clinical] : TNM Stage: c [ROSE] : ROSE [TTNM] : 4a [NTNM] : 2b [de-identified] : 70 Gy [MTNM] : 0 [de-identified] : maxillary sinus

## 2019-12-06 NOTE — HISTORY OF PRESENT ILLNESS
[FreeTextEntry1] : Mr. 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  He developed grade 2 xerostomia, grade 2 dysguesia, and grade 2 dermatitis while on treatment. He did not develop any grade 3 or higher acute toxicities as a result of radiation therapy.  He plans to go to [place] soon, he comes for f/u visit sooner. \par \par Advised to f/u with Med Onc and ENT service too.\par \par

## 2019-12-06 NOTE — HISTORY OF PRESENT ILLNESS
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 \par \par h/o chemo for maxillary sinus SCC in 9/2019\par \par 34/35 fractions of radiation treatment today. c/o mouth pain at times, and dry mouth. No taste.  On gabapentin from Med Onc. No dysphagia, cough, SOB, neck pain, headache, dizziness. have blood test done once a week.  Not using feeding tube yet, but Pt went to surgeon to get it out yesterday without permission from us. Advised to use Aquaphor over RT area.  Also using some OTC mouthwash Prevention. \par

## 2019-12-09 NOTE — ASSESSMENT
[Palliative] : Goals of care discussed with patient: Palliative [FreeTextEntry1] : Mr. GORDON BROMBERG is a pleasant 79 year old man who comes here today after 3 cycles of pembro/carbo/taxol to treat  his diagnosis of maxillary sinus carcinoma- had a complete response. He finished 70Gy of radiation therapy without major issues. \par \par Oral mucositis- gabapentin\par Hypothyroidism- on levothyroxine 75 mcg/day. \par Patient will go to [place] next week. Gave him a name of an oncologist for him to establish care there. Once he comes back to [place], he will see Dr. Estrada as I am leaving [facility]. \par \par \par Cassia Campbell MD\par \par \par

## 2019-12-09 NOTE — HISTORY OF PRESENT ILLNESS
[T: ___] : T[unfilled] [Disease: _____________________] : Disease: [unfilled] [M: ___] : M[unfilled] [N: ___] : N[unfilled] [AJCC Stage: ____] : AJCC Stage: [unfilled] [de-identified] : Mr. GORDON BROMBERG is a pleasant 79 year old man who comes here today after having finished radiation therapy. 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 As the bump started to grow, he sought medical help and had the work up done below. \par \par PET-CT 6/17/19\par 1. Hypermetabolic enhancing left upper gingival mass corresponds to patient's known squamous cell carcinoma. Please see report of contrast-enhanced CT of same day for further description of the primary lesion. \par 2. Nonspecific, asymmetric hypermetabolism in left nasopharynx and left greater than right base of tongue. Please correlate with direct visualization. \par 3. Hypermetabolic bilateral level IB and IIA cervical lymph nodes are compatible with metastatic disease. Ultrasound-guided percutaneous needle biopsy may be obtained for confirmation. \par 4. Non FDG-avid right upper lobe nodular opacity is nonspecific. Correlate clinically for infection. A dedicated CT of chest is recommended in one month for further evaluation. \par 5. Markedly enlarged prostate gland without associated hypermetabolism.\par \par CT neck 6/17/19\par Large plaque like enhancement abnormality centered along the left maxillary gingivobuccal sulcus extending into the left maxillary sinus and toward the left infraorbital canal, and left greater palatine foramen as described. Contrast-enhanced MR imaging of the skull base can be done to evaluate for possible perineural spread of disease. \par \par Contiguous versus possible synchronous additional malignant disease seen along the left nasopharynx, extending to the uvula. Left asymmetric lingual tonsillar enhancement extending to the vallecula which may represent tonsillar hypertrophy though additional neoplastic disease is not excluded. \par \par Bilateral pathologic level I, left II, bilateral III lymphadenopathy. \par \par MRI brain 7/2/19\par A large bulky mass with associated bony destruction is again noted involving the left maxillary alveolus, floor of the left maxillary sinus, and lower aspect of the posterior lateral wall of the left maxillary sinus, with extension to the left buccal space across the gingival buccal sulcus. The tumor extends into the lower retromaxillary fat. Tumor abuts and may involve the base of the pterygoid plates and greater-lesser palatine foramina, however the pterygopalatine fossa, infraorbital canal, Meckel's caves, and cavernous sinuses appear grossly spared. A component of the tumor may spread submucosally underneath the left hard palate. \par \par Nonspecific asymmetric enhancement involving the left nasopharynx, left soft palate, uvula, and left tonsil appears stable compared to the prior CT. Correlate with direct visualization findings to exclude mucosal or submucosal spread of tumor versus lymphoid hypertrophy. \par \par Heterogeneous bilateral level 1 and level 2 lymph nodes are again noted suspicious for pathologic lymphadenopathy, stable in size compared to the CT examination. 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  [de-identified] : SCC [de-identified] : Mr. Bromberg is doing well. Eating soft and liquid food, dysgeusia is present but improving. Some  lip pain, worse when eats, pain does not radiate, and is partially relieved by gabapentin. All other ROS negative.

## 2019-12-09 NOTE — PHYSICAL EXAM
[Restricted in physically strenuous activity but ambulatory and able to carry out work of a light or sedentary nature] : Status 1- Restricted in physically strenuous activity but ambulatory and able to carry out work of a light or sedentary nature, e.g., light house work, office work [Obese] : obese [Normal] : affect appropriate [de-identified] : no tumor is seen, no thrush, lip cracked, oral mucositis improved [de-identified] : ventral hernia, liver edge palpable

## 2019-12-10 ENCOUNTER — APPOINTMENT (OUTPATIENT)
Dept: OTOLARYNGOLOGY | Facility: CLINIC | Age: 79
End: 2019-12-10

## 2019-12-10 VITALS
HEIGHT: 66 IN | WEIGHT: 183 LBS | BODY MASS INDEX: 29.41 KG/M2 | HEART RATE: 87 BPM | SYSTOLIC BLOOD PRESSURE: 125 MMHG | DIASTOLIC BLOOD PRESSURE: 82 MMHG

## 2019-12-10 NOTE — HISTORY OF PRESENT ILLNESS
[de-identified] : Pt is s/p induction chemo and CXRT for L maxilla SCCa.  Pt is eating a soft diet, maintaining weight.  He has some pain when he eats and still has a lack of taste.  PEG tube was pulled by Dr. Ramirez.

## 2019-12-30 NOTE — HISTORY OF PRESENT ILLNESS
[de-identified] : Pt is here s/p his third chemo cycle on 9/4/19 and s/p a scan done Sunday.  Pt is here to discuss the results.  PT has no complaints.  Feels well.  He denies any pain, dyspnea, dysphagia, otalgia or weight loss.

## 2019-12-30 NOTE — PROCEDURE
[Mass] : a mass [None] : none [Flexible Endoscope] : examined with the flexible endoscope [Serial Number: ___] : Serial Number: [unfilled] [Normal] : the middle meatus had no abnormalities [FreeTextEntry6] : There is no involvement in the NPx and the soft palate appears clear as well.

## 2019-12-30 NOTE — PHYSICAL EXAM
[de-identified] : L. neck level I feels improved, no other LAD. [Laryngoscopy Performed] : laryngoscopy was performed, see procedure section for findings [FreeTextEntry1] : Significant improvement in the primary disease with great decrease in size of the lesion but possible residual disease along the gingivolabial sulcus on the left maxilla vs fibrosis from treatment?  No other lesions. [Normal] : no rashes [de-identified] : No other LAD.

## 2020-01-06 ENCOUNTER — CLINICAL ADVICE (OUTPATIENT)
Age: 80
End: 2020-01-06

## 2020-01-22 ENCOUNTER — CLINICAL ADVICE (OUTPATIENT)
Age: 80
End: 2020-01-22

## 2020-04-09 ENCOUNTER — OUTPATIENT (OUTPATIENT)
Dept: OUTPATIENT SERVICES | Facility: HOSPITAL | Age: 80
LOS: 1 days | Discharge: HOME | End: 2020-04-09

## 2020-04-09 DIAGNOSIS — Z92.3 PERSONAL HISTORY OF IRRADIATION: Chronic | ICD-10-CM

## 2020-04-09 DIAGNOSIS — Z92.21 PERSONAL HISTORY OF ANTINEOPLASTIC CHEMOTHERAPY: Chronic | ICD-10-CM

## 2020-04-09 DIAGNOSIS — C06.9 MALIGNANT NEOPLASM OF MOUTH, UNSPECIFIED: ICD-10-CM

## 2020-04-14 ENCOUNTER — APPOINTMENT (OUTPATIENT)
Dept: OTOLARYNGOLOGY | Facility: CLINIC | Age: 80
End: 2020-04-14
Payer: MEDICARE

## 2020-04-14 ENCOUNTER — APPOINTMENT (OUTPATIENT)
Dept: HEMATOLOGY ONCOLOGY | Facility: CLINIC | Age: 80
End: 2020-04-14

## 2020-04-14 VITALS
BODY MASS INDEX: 29.41 KG/M2 | DIASTOLIC BLOOD PRESSURE: 83 MMHG | WEIGHT: 183 LBS | SYSTOLIC BLOOD PRESSURE: 155 MMHG | HEIGHT: 66 IN | HEART RATE: 62 BPM

## 2020-04-14 VITALS — TEMPERATURE: 98.9 F

## 2020-04-14 PROCEDURE — 99215 OFFICE O/P EST HI 40 MIN: CPT | Mod: 25

## 2020-04-14 PROCEDURE — 31231 NASAL ENDOSCOPY DX: CPT

## 2020-04-14 RX ORDER — DORZOLAMIDE HYDROCHLORIDE AND TIMOLOL MALEATE 20; 5 MG/ML; MG/ML
SOLUTION/ DROPS OPHTHALMIC
Refills: 0 | Status: COMPLETED | COMMUNITY
End: 2020-04-14

## 2020-04-14 RX ORDER — GABAPENTIN 250 MG/5ML
300 SOLUTION ORAL
Qty: 2 | Refills: 2 | Status: COMPLETED | COMMUNITY
Start: 2019-10-25 | End: 2020-04-14

## 2020-04-14 RX ORDER — LATANOPROSTENE BUNOD 0.24 MG/ML
0.02 SOLUTION/ DROPS OPHTHALMIC
Refills: 0 | Status: COMPLETED | COMMUNITY
End: 2020-04-14

## 2020-04-14 RX ORDER — BESIFLOXACIN 6 MG/ML
0.6 SUSPENSION OPHTHALMIC
Qty: 5 | Refills: 0 | Status: COMPLETED | COMMUNITY
Start: 2019-10-14 | End: 2020-04-14

## 2020-04-14 RX ORDER — 70%ISOPROPYL ALCOHOL 0.7 ML/ML
70 SWAB TOPICAL
Refills: 0 | Status: COMPLETED | COMMUNITY
End: 2020-04-14

## 2020-04-14 NOTE — PROCEDURE
[Mass] : a mass [Normal] : the middle meatus had no abnormalities [FreeTextEntry6] : There is no involvement in the NPx and the soft palate appears clear as well.  [Gag Reflex] : gag reflex preventing mirror examination [None] : none [Flexible Endoscope] : examined with the flexible endoscope [Serial Number: ___] : Serial Number: [unfilled] [de-identified] : No lesions in the NPx, OPx, HPx or larynx.  Expected posttreatment changes, VC are mobile, airway patent.\par

## 2020-04-14 NOTE — PHYSICAL EXAM
[de-identified] : Posttreatment changes, no LAD. [Laryngoscopy Performed] : laryngoscopy was performed, see procedure section for findings [FreeTextEntry1] : Significant improvement in the primary disease with great decrease in size of the lesion but possible residual disease along the gingivolabial sulcus on the left maxilla vs fibrosis from treatment?  There is also a new R. buccal lesion, approx. 2.5 cm x 1 cm, firm, no TTP. [Normal] : no rashes [de-identified] : No other LAD.

## 2020-04-14 NOTE — HISTORY OF PRESENT ILLNESS
[de-identified] : Pt with hx of L maxilla SCCa.  Pt is s/p CXRT.  Pt was in [place] felt a bump in his mouth in March and he had a biopsy on the R side of the mouth on 3/2/2020 which resulted as SCCa.  Pt had a PET scan done and brought in the disc.  He denies any pain, dyspnea, otalgia.  His weight is stable.  He is bothered by the xerostomia and reports a lack of appetite.  He feels that he is swallowing relatively well.

## 2020-04-14 NOTE — CONSULT LETTER
[Dear  ___] : Dear  [unfilled], [Courtesy Letter:] : I had the pleasure of seeing your patient, [unfilled], in my office today. [Please see my note below.] : Please see my note below. [Consult Closing:] : Thank you very much for allowing me to participate in the care of this patient.  If you have any questions, please do not hesitate to contact me. [Sincerely,] : Sincerely, [FreeTextEntry2] : Dr. Jack Lundy\par [address], \[place] [address]  [FreeTextEntry3] : Dev\par \par Ho Roca MD FACS\par Division of Head and Neck Surgery\par Department of Otolaryngology \par [facility]\par \par  of Otolaryngology\par Assistant Professor of Surgery\par [facility] [employer] [facility]

## 2020-04-14 NOTE — DATA REVIEWED
[de-identified] : PET/CT with 3.9 x 2.8 cm mass in the L. oral cavity with SUV of 8.  There is also L. BOT asymmetric avidity with SUV of 4.5.  On my review of the PET/CT there is also avidity along the R. buccal mucosa which is not reported. [de-identified] : Pathology from R. oral cavity is reported as well to moderately differentiated SCCa.

## 2020-04-15 ENCOUNTER — LABORATORY RESULT (OUTPATIENT)
Age: 80
End: 2020-04-15

## 2020-04-15 ENCOUNTER — APPOINTMENT (OUTPATIENT)
Dept: HEMATOLOGY ONCOLOGY | Facility: CLINIC | Age: 80
End: 2020-04-15
Payer: MEDICARE

## 2020-04-15 ENCOUNTER — RESULT REVIEW (OUTPATIENT)
Age: 80
End: 2020-04-15

## 2020-04-15 VITALS
RESPIRATION RATE: 17 BRPM | SYSTOLIC BLOOD PRESSURE: 144 MMHG | WEIGHT: 179.68 LBS | OXYGEN SATURATION: 96 % | DIASTOLIC BLOOD PRESSURE: 86 MMHG | HEART RATE: 66 BPM | BODY MASS INDEX: 29.94 KG/M2 | TEMPERATURE: 98.4 F | HEIGHT: 64.96 IN

## 2020-04-15 DIAGNOSIS — R11.2 NAUSEA WITH VOMITING, UNSPECIFIED: ICD-10-CM

## 2020-04-15 DIAGNOSIS — T45.1X5A NAUSEA WITH VOMITING, UNSPECIFIED: ICD-10-CM

## 2020-04-15 LAB
BASOPHILS # BLD AUTO: 0.03 K/UL — SIGNIFICANT CHANGE UP (ref 0–0.2)
BASOPHILS NFR BLD AUTO: 0.5 % — SIGNIFICANT CHANGE UP (ref 0–2)
EOSINOPHIL # BLD AUTO: 0.2 K/UL — SIGNIFICANT CHANGE UP (ref 0–0.5)
EOSINOPHIL NFR BLD AUTO: 3.6 % — SIGNIFICANT CHANGE UP (ref 0–6)
HCT VFR BLD CALC: 43.6 % — SIGNIFICANT CHANGE UP (ref 39–50)
HGB BLD-MCNC: 14.5 G/DL — SIGNIFICANT CHANGE UP (ref 13–17)
IMM GRANULOCYTES NFR BLD AUTO: 0.7 % — SIGNIFICANT CHANGE UP (ref 0–1.5)
LYMPHOCYTES # BLD AUTO: 0.57 K/UL — LOW (ref 1–3.3)
LYMPHOCYTES # BLD AUTO: 10.2 % — LOW (ref 13–44)
MCHC RBC-ENTMCNC: 30.6 PG — SIGNIFICANT CHANGE UP (ref 27–34)
MCHC RBC-ENTMCNC: 33.3 GM/DL — SIGNIFICANT CHANGE UP (ref 32–36)
MCV RBC AUTO: 92 FL — SIGNIFICANT CHANGE UP (ref 80–100)
MONOCYTES # BLD AUTO: 0.92 K/UL — HIGH (ref 0–0.9)
MONOCYTES NFR BLD AUTO: 16.5 % — HIGH (ref 2–14)
NEUTROPHILS # BLD AUTO: 3.81 K/UL — SIGNIFICANT CHANGE UP (ref 1.8–7.4)
NEUTROPHILS NFR BLD AUTO: 68.5 % — SIGNIFICANT CHANGE UP (ref 43–77)
NRBC # BLD: 0 /100 WBCS — SIGNIFICANT CHANGE UP (ref 0–0)
PLATELET # BLD AUTO: 285 K/UL — SIGNIFICANT CHANGE UP (ref 150–400)
RBC # BLD: 4.74 M/UL — SIGNIFICANT CHANGE UP (ref 4.2–5.8)
RBC # FLD: 12.6 % — SIGNIFICANT CHANGE UP (ref 10.3–14.5)
WBC # BLD: 5.57 K/UL — SIGNIFICANT CHANGE UP (ref 3.8–10.5)
WBC # FLD AUTO: 5.57 K/UL — SIGNIFICANT CHANGE UP (ref 3.8–10.5)

## 2020-04-15 PROCEDURE — 99215 OFFICE O/P EST HI 40 MIN: CPT

## 2020-04-15 RX ORDER — DORZOLAMIDE HYDROCHLORIDE 20 MG/ML
2 SOLUTION OPHTHALMIC
Qty: 30 | Refills: 0 | Status: DISCONTINUED | COMMUNITY
Start: 2019-04-18

## 2020-04-15 RX ORDER — FLUCONAZOLE 100 MG/1
100 TABLET ORAL
Qty: 10 | Refills: 0 | Status: DISCONTINUED | COMMUNITY
Start: 2020-02-05

## 2020-04-15 RX ORDER — BENZONATATE 200 MG/1
200 CAPSULE ORAL
Qty: 20 | Refills: 0 | Status: DISCONTINUED | COMMUNITY
Start: 2020-01-22

## 2020-04-15 RX ORDER — DOXYCYCLINE 100 MG/1
100 CAPSULE ORAL
Qty: 14 | Refills: 0 | Status: DISCONTINUED | COMMUNITY
Start: 2020-01-22

## 2020-04-15 RX ORDER — OFLOXACIN 3 MG/ML
0.3 SOLUTION/ DROPS OPHTHALMIC
Qty: 5 | Refills: 0 | Status: DISCONTINUED | COMMUNITY
Start: 2019-11-01

## 2020-04-16 LAB
ALBUMIN SERPL ELPH-MCNC: 4.6 G/DL
ALP BLD-CCNC: 68 U/L
ALT SERPL-CCNC: 17 U/L
ANION GAP SERPL CALC-SCNC: 13 MMOL/L
APTT BLD: 32.4 SEC
AST SERPL-CCNC: 21 U/L
BILIRUB SERPL-MCNC: 0.3 MG/DL
BUN SERPL-MCNC: 19 MG/DL
CALCIUM SERPL-MCNC: 10 MG/DL
CHLORIDE SERPL-SCNC: 98 MMOL/L
CO2 SERPL-SCNC: 28 MMOL/L
CORTIS SERPL-MCNC: 10.7 UG/DL
CREAT SERPL-MCNC: 1.02 MG/DL
GLUCOSE SERPL-MCNC: 99 MG/DL
HAV IGM SER QL: NONREACTIVE
HBV CORE IGM SER QL: NONREACTIVE
HBV SURFACE AG SER QL: NONREACTIVE
HCV AB SER QL: NONREACTIVE
HCV S/CO RATIO: 0.28 S/CO
INR PPP: 0.99 RATIO
MAGNESIUM SERPL-MCNC: 2.3 MG/DL
POTASSIUM SERPL-SCNC: 4.8 MMOL/L
PROT SERPL-MCNC: 8 G/DL
PT BLD: 11.3 SEC
SODIUM SERPL-SCNC: 139 MMOL/L
T3RU NFR SERPL: 1.1 TBI
T4 SERPL-MCNC: 6.1 UG/DL

## 2020-04-17 ENCOUNTER — OUTPATIENT (OUTPATIENT)
Dept: OUTPATIENT SERVICES | Facility: HOSPITAL | Age: 80
LOS: 1 days | End: 2020-04-17
Payer: MEDICARE

## 2020-04-17 ENCOUNTER — RESULT REVIEW (OUTPATIENT)
Age: 80
End: 2020-04-17

## 2020-04-17 ENCOUNTER — APPOINTMENT (OUTPATIENT)
Dept: MRI IMAGING | Facility: IMAGING CENTER | Age: 80
End: 2020-04-17
Payer: MEDICARE

## 2020-04-17 DIAGNOSIS — Z92.3 PERSONAL HISTORY OF IRRADIATION: Chronic | ICD-10-CM

## 2020-04-17 DIAGNOSIS — C03.0 MALIGNANT NEOPLASM OF UPPER GUM: ICD-10-CM

## 2020-04-17 DIAGNOSIS — C06.0 MALIGNANT NEOPLASM OF CHEEK MUCOSA: ICD-10-CM

## 2020-04-17 DIAGNOSIS — Z92.21 PERSONAL HISTORY OF ANTINEOPLASTIC CHEMOTHERAPY: Chronic | ICD-10-CM

## 2020-04-17 PROBLEM — R11.2 CHEMOTHERAPY-INDUCED NAUSEA AND VOMITING: Status: ACTIVE | Noted: 2019-07-26

## 2020-04-17 PROCEDURE — 70543 MRI ORBT/FAC/NCK W/O &W/DYE: CPT

## 2020-04-17 PROCEDURE — A9585: CPT

## 2020-04-17 PROCEDURE — 70543 MRI ORBT/FAC/NCK W/O &W/DYE: CPT | Mod: 26

## 2020-04-17 NOTE — RESULTS/DATA
[FreeTextEntry1] : ECG reviewed normal sinus rhythm left axis deviation. q waves in II and II inferior infarct

## 2020-04-17 NOTE — HISTORY OF PRESENT ILLNESS
[Disease: _____________________] : Disease: [unfilled] [T: ___] : T[unfilled] [N: ___] : N[unfilled] [M: ___] : M[unfilled] [AJCC Stage: ____] : AJCC Stage: [unfilled] [Treatment Protocol] : Treatment Protocol [Date: ____________] : Patient's last distress assessment performed on [unfilled]. [de-identified] : Mr. GORDON BROMBERG is a pleasant 79 year old man who comes here today after having finished radiation therapy. 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 As the bump started to grow, he sought medical help and had the work up done below. \par \par PET-CT 6/17/19\par 1. Hypermetabolic enhancing left upper gingival mass corresponds to patient's known squamous cell carcinoma. Please see report of contrast-enhanced CT of same day for further description of the primary lesion. \par 2. Nonspecific, asymmetric hypermetabolism in left nasopharynx and left greater than right base of tongue. Please correlate with direct visualization. \par 3. Hypermetabolic bilateral level IB and IIA cervical lymph nodes are compatible with metastatic disease. Ultrasound-guided percutaneous needle biopsy may be obtained for confirmation. \par 4. Non FDG-avid right upper lobe nodular opacity is nonspecific. Correlate clinically for infection. A dedicated CT of chest is recommended in one month for further evaluation. \par 5. Markedly enlarged prostate gland without associated hypermetabolism.\par \par CT neck 6/17/19\par Large plaque like enhancement abnormality centered along the left maxillary gingivobuccal sulcus extending into the left maxillary sinus and toward the left infraorbital canal, and left greater palatine foramen as described. Contrast-enhanced MR imaging of the skull base can be done to evaluate for possible perineural spread of disease. \par \par Contiguous versus possible synchronous additional malignant disease seen along the left nasopharynx, extending to the uvula. Left asymmetric lingual tonsillar enhancement extending to the vallecula which may represent tonsillar hypertrophy though additional neoplastic disease is not excluded. \par \par Bilateral pathologic level I, left II, bilateral III lymphadenopathy. \par \par MRI brain 7/2/19\par A large bulky mass with associated bony destruction is again noted involving the left maxillary alveolus, floor of the left maxillary sinus, and lower aspect of the posterior lateral wall of the left maxillary sinus, with extension to the left buccal space across the gingival buccal sulcus. The tumor extends into the lower retromaxillary fat. Tumor abuts and may involve the base of the pterygoid plates and greater-lesser palatine foramina, however the pterygopalatine fossa, infraorbital canal, Meckel's caves, and cavernous sinuses appear grossly spared. A component of the tumor may spread submucosally underneath the left hard palate. \par \par Nonspecific asymmetric enhancement involving the left nasopharynx, left soft palate, uvula, and left tonsil appears stable compared to the prior CT. Correlate with direct visualization findings to exclude mucosal or submucosal spread of tumor versus lymphoid hypertrophy. \par \par Heterogeneous bilateral level 1 and level 2 lymph nodes are again noted suspicious for pathologic lymphadenopathy, stable in size compared to the CT examination. 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 \par \par past medical history reviewed no history of a heart attack [de-identified] : SCC [FreeTextEntry1] : discussion of recurrence [de-identified] : Mr. Bromberg is doing well. Eating soft and liquid food, he had  dysgeusia but improved in December 2019. He went to [facility] in the last week of December 2019.\par The patient thought that there was regrowth of tumor in January 2020; he felt a bulge in his cheek. The bulge increased in size but it did not interfere with eating. The mouth is not eating.\par No appetite. He has lost 25 lbs.\par CT/PET performed March 20 2020 in [place]: showed 3.9 cm X 2.8 cm FDG avid mass (SUV 8.0) left side of oral cavity; no description of lymph node . The patient hasd a biopsy of the right buccal tumor by Dr Kartik Graves DO. The biopsy showed squamous cell carcinoma\par The patient is not in pain..

## 2020-04-17 NOTE — PHYSICAL EXAM
[Restricted in physically strenuous activity but ambulatory and able to carry out work of a light or sedentary nature] : Status 1- Restricted in physically strenuous activity but ambulatory and able to carry out work of a light or sedentary nature, e.g., light house work, office work [Obese] : obese [Normal] : affect appropriate [de-identified] : no tumor is seen, no thrush, lip cracked, oral mucositis improved [de-identified] : ventral hernia, liver edge palpable

## 2020-04-17 NOTE — REVIEW OF SYSTEMS
[Negative] : Allergic/Immunologic [Fatigue] : fatigue [Recent Change In Weight] : ~T recent weight change [Eye Pain] : eye pain [Dysphagia] : dysphagia [Odynophagia] : odynophagia [Mucosal Pain] : mucosal pain [Fever] : no fever [Chills] : no chills [Night Sweats] : no night sweats [Loss of Hearing] : no loss of hearing [Nosebleeds] : no nosebleeds [Hoarseness] : no hoarseness [FreeTextEntry3] : glaucoma left eye [FreeTextEntry4] : left ear hearing aid otosclerosis

## 2020-04-17 NOTE — ASSESSMENT
[Supportive] : Goals of care discussed with patient: Supportive [Palliative Care Plan] : not applicable at this time [FreeTextEntry1] : Mr. GORDON BROMBERG is a pleasant 79 year old man who comes here today after 3 cycles of pembro/carbo/taxol to treat  his diagnosis of maxillary sinus carcinoma- had a complete response. He finished 70Gy of radiation therapy without major issues. \par \par Oral mucositis- gabapentin\par Hypothyroidism- on levothyroxine 75 mcg/day. \par Patient has new disease in the right bucca membrane that is told by the patient to me to be squamous cell carcinoma . The tumor is in a different area than the first lesion which was in the left buccal membrane. The patient has been sen by DR Roca; according to the patient Dr Roca has the disc regarding the tumor biopsy.\par I would re starting chemotherapy with carboplatin and paclitaxel and pembrolizumab. I have given the patient educational information on the side effects of treatment with carboplatin and paclitaxel and pembrolizumab.\par The patient has signed consent for chemotherapy.\par The patient will be discussed at the multispecialty tumor conference prior to start of treatment\par \par \par \par

## 2020-04-24 ENCOUNTER — APPOINTMENT (OUTPATIENT)
Dept: HEMATOLOGY ONCOLOGY | Facility: CLINIC | Age: 80
End: 2020-04-24
Payer: MEDICARE

## 2020-04-24 PROCEDURE — 99443: CPT | Mod: CR

## 2020-04-26 NOTE — RESULTS/DATA
[FreeTextEntry1] : review of the blood studies with the patient on the telephone; normal CBC and CMP; normal thyroid testing while on thyroid replacement medication. ECG and consent form reviewed

## 2020-04-26 NOTE — HISTORY OF PRESENT ILLNESS
[Verbal consent obtained from patient] : the patient, [unfilled] [T: ___] : T[unfilled] [Disease: _____________________] : Disease: [unfilled] [N: ___] : N[unfilled] [M: ___] : M[unfilled] [AJCC Stage: ____] : AJCC Stage: [unfilled] [Treatment Protocol] : Treatment Protocol [Date: ____________] : Patient's last distress assessment performed on [unfilled]. [0 - No Distress] : Distress Level: 0 [de-identified] : SCC [de-identified] : Mr. GORDON BROMBERG is a pleasant 79 year old man who comes here today after having finished radiation therapy. 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 As the bump started to grow, he sought medical help and had the work up done below. \par \par PET-CT 6/17/19\par 1. Hypermetabolic enhancing left upper gingival mass corresponds to patient's known squamous cell carcinoma. Please see report of contrast-enhanced CT of same day for further description of the primary lesion. \par 2. Nonspecific, asymmetric hypermetabolism in left nasopharynx and left greater than right base of tongue. Please correlate with direct visualization. \par 3. Hypermetabolic bilateral level IB and IIA cervical lymph nodes are compatible with metastatic disease. Ultrasound-guided percutaneous needle biopsy may be obtained for confirmation. \par 4. Non FDG-avid right upper lobe nodular opacity is nonspecific. Correlate clinically for infection. A dedicated CT of chest is recommended in one month for further evaluation. \par 5. Markedly enlarged prostate gland without associated hypermetabolism.\par \par CT neck 6/17/19\par Large plaque like enhancement abnormality centered along the left maxillary gingivobuccal sulcus extending into the left maxillary sinus and toward the left infraorbital canal, and left greater palatine foramen as described. Contrast-enhanced MR imaging of the skull base can be done to evaluate for possible perineural spread of disease. \par \par Contiguous versus possible synchronous additional malignant disease seen along the left nasopharynx, extending to the uvula. Left asymmetric lingual tonsillar enhancement extending to the vallecula which may represent tonsillar hypertrophy though additional neoplastic disease is not excluded. \par \par Bilateral pathologic level I, left II, bilateral III lymphadenopathy. \par \par MRI brain 7/2/19\par A large bulky mass with associated bony destruction is again noted involving the left maxillary alveolus, floor of the left maxillary sinus, and lower aspect of the posterior lateral wall of the left maxillary sinus, with extension to the left buccal space across the gingival buccal sulcus. The tumor extends into the lower retromaxillary fat. Tumor abuts and may involve the base of the pterygoid plates and greater-lesser palatine foramina, however the pterygopalatine fossa, infraorbital canal, Meckel's caves, and cavernous sinuses appear grossly spared. A component of the tumor may spread submucosally underneath the left hard palate. \par \par Nonspecific asymmetric enhancement involving the left nasopharynx, left soft palate, uvula, and left tonsil appears stable compared to the prior CT. Correlate with direct visualization findings to exclude mucosal or submucosal spread of tumor versus lymphoid hypertrophy. \par \par Heterogeneous bilateral level 1 and level 2 lymph nodes are again noted suspicious for pathologic lymphadenopathy, stable in size compared to the CT examination. 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 \par \par past medical history reviewed no history of a heart attack [FreeTextEntry1] : discussion of recurrence and scheduling of treatment [de-identified] : The patient has noted the continued presence of tumor in the right side of the buccal membrane. He tells me that it interfers with the swallowing of food. N nausea and no vomitng

## 2020-04-26 NOTE — ASSESSMENT
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 He has recurred in the area that did not receive radiation and he will be scheduled to begin carboplatin AUC 4 paclitaxel 150 mg/m2 and pembrolizumab 200 mg IV on Wednesday April 29 2020.A full discussion of his history and imaging from [place] was conducted at the April 22 2020 multidisciplinary Otolaryngology conference. The tumor board participants including me, Dr Roca and Dr Power recommended chemotherapy treatment\par Review of side effects of medication with the patient today and the process of scheduling chemotherapy reviewed with him, his wife and Ms RAHUL Ragland. I will enter chemotherapy orders

## 2020-04-28 ENCOUNTER — RESULT REVIEW (OUTPATIENT)
Age: 80
End: 2020-04-28

## 2020-04-28 ENCOUNTER — APPOINTMENT (OUTPATIENT)
Dept: HEMATOLOGY ONCOLOGY | Facility: CLINIC | Age: 80
End: 2020-04-28
Payer: MEDICARE

## 2020-04-28 ENCOUNTER — APPOINTMENT (OUTPATIENT)
Dept: INFUSION THERAPY | Facility: HOSPITAL | Age: 80
End: 2020-04-28

## 2020-04-28 VITALS
DIASTOLIC BLOOD PRESSURE: 70 MMHG | SYSTOLIC BLOOD PRESSURE: 135 MMHG | HEART RATE: 68 BPM | RESPIRATION RATE: 18 BRPM | TEMPERATURE: 98.8 F

## 2020-04-28 LAB
BASOPHILS # BLD AUTO: 0.02 K/UL — SIGNIFICANT CHANGE UP (ref 0–0.2)
BASOPHILS NFR BLD AUTO: 0.2 % — SIGNIFICANT CHANGE UP (ref 0–2)
EOSINOPHIL # BLD AUTO: 0.01 K/UL — SIGNIFICANT CHANGE UP (ref 0–0.5)
EOSINOPHIL NFR BLD AUTO: 0.1 % — SIGNIFICANT CHANGE UP (ref 0–6)
HCT VFR BLD CALC: 40.5 % — SIGNIFICANT CHANGE UP (ref 39–50)
HGB BLD-MCNC: 13.9 G/DL — SIGNIFICANT CHANGE UP (ref 13–17)
IMM GRANULOCYTES NFR BLD AUTO: 0.6 % — SIGNIFICANT CHANGE UP (ref 0–1.5)
LYMPHOCYTES # BLD AUTO: 0.51 K/UL — LOW (ref 1–3.3)
LYMPHOCYTES # BLD AUTO: 3.9 % — LOW (ref 13–44)
MCHC RBC-ENTMCNC: 30.7 PG — SIGNIFICANT CHANGE UP (ref 27–34)
MCHC RBC-ENTMCNC: 34.3 GM/DL — SIGNIFICANT CHANGE UP (ref 32–36)
MCV RBC AUTO: 89.4 FL — SIGNIFICANT CHANGE UP (ref 80–100)
MONOCYTES # BLD AUTO: 0.92 K/UL — HIGH (ref 0–0.9)
MONOCYTES NFR BLD AUTO: 7.1 % — SIGNIFICANT CHANGE UP (ref 2–14)
NEUTROPHILS # BLD AUTO: 11.41 K/UL — HIGH (ref 1.8–7.4)
NEUTROPHILS NFR BLD AUTO: 88.1 % — HIGH (ref 43–77)
NRBC # BLD: 0 /100 WBCS — SIGNIFICANT CHANGE UP (ref 0–0)
PLATELET # BLD AUTO: 295 K/UL — SIGNIFICANT CHANGE UP (ref 150–400)
RBC # BLD: 4.53 M/UL — SIGNIFICANT CHANGE UP (ref 4.2–5.8)
RBC # FLD: 12.7 % — SIGNIFICANT CHANGE UP (ref 10.3–14.5)
WBC # BLD: 12.95 K/UL — HIGH (ref 3.8–10.5)
WBC # FLD AUTO: 12.95 K/UL — HIGH (ref 3.8–10.5)

## 2020-04-28 PROCEDURE — 99214 OFFICE O/P EST MOD 30 MIN: CPT

## 2020-04-28 NOTE — ASSESSMENT
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 He is currently receiving his re exposure to platin therapy and were are employing the stepped dose retreatment : carboplatin AUC 4; 1% in first hour, 9% in second hour and 90 % in the third hour.\par paclitaxel and pembrolizumab will follow in the regimen. Regimen to be repeated ever 3 weeks. \par May consider DC of carboplatin/paclitaxel and use of only pembrolizumab after 4 cycles if remission is seen.\par RTC  3 weeks.

## 2020-04-28 NOTE — PHYSICAL EXAM
[Restricted in physically strenuous activity but ambulatory and able to carry out work of a light or sedentary nature] : Status 1- Restricted in physically strenuous activity but ambulatory and able to carry out work of a light or sedentary nature, e.g., light house work, office work [Obese] : obese [Normal] : affect appropriate [Ulcers] : no ulcers [Mucositis] : no mucositis [Thrush] : no thrush [Vesicles] : no vesicles [de-identified] : 4 cm X 3 cm right buccal tumor is seen, no thrush,  [de-identified] : ventral hernia, liver edge palpable

## 2020-04-28 NOTE — REVIEW OF SYSTEMS
[Fatigue] : fatigue [Recent Change In Weight] : ~T recent weight change [Chills] : no chills [Night Sweats] : no night sweats

## 2020-04-28 NOTE — HISTORY OF PRESENT ILLNESS
[Disease: _____________________] : Disease: [unfilled] [T: ___] : T[unfilled] [N: ___] : N[unfilled] [M: ___] : M[unfilled] [AJCC Stage: ____] : AJCC Stage: [unfilled] [Treatment Protocol] : Treatment Protocol [Date: ____________] : Patient's last distress assessment performed on [unfilled]. [0 - No Distress] : Distress Level: 0 [de-identified] : Mr. GORDON BROMBERG is a pleasant 79 year old man who comes here today after having finished radiation therapy. 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 As the bump started to grow, he sought medical help and had the work up done below. \par \par PET-CT 6/17/19\par 1. Hypermetabolic enhancing left upper gingival mass corresponds to patient's known squamous cell carcinoma. Please see report of contrast-enhanced CT of same day for further description of the primary lesion. \par 2. Nonspecific, asymmetric hypermetabolism in left nasopharynx and left greater than right base of tongue. Please correlate with direct visualization. \par 3. Hypermetabolic bilateral level IB and IIA cervical lymph nodes are compatible with metastatic disease. Ultrasound-guided percutaneous needle biopsy may be obtained for confirmation. \par 4. Non FDG-avid right upper lobe nodular opacity is nonspecific. Correlate clinically for infection. A dedicated CT of chest is recommended in one month for further evaluation. \par 5. Markedly enlarged prostate gland without associated hypermetabolism.\par \par CT neck 6/17/19\par Large plaque like enhancement abnormality centered along the left maxillary gingivobuccal sulcus extending into the left maxillary sinus and toward the left infraorbital canal, and left greater palatine foramen as described. Contrast-enhanced MR imaging of the skull base can be done to evaluate for possible perineural spread of disease. \par \par Contiguous versus possible synchronous additional malignant disease seen along the left nasopharynx, extending to the uvula. Left asymmetric lingual tonsillar enhancement extending to the vallecula which may represent tonsillar hypertrophy though additional neoplastic disease is not excluded. \par \par Bilateral pathologic level I, left II, bilateral III lymphadenopathy. \par \par MRI brain 7/2/19\par A large bulky mass with associated bony destruction is again noted involving the left maxillary alveolus, floor of the left maxillary sinus, and lower aspect of the posterior lateral wall of the left maxillary sinus, with extension to the left buccal space across the gingival buccal sulcus. The tumor extends into the lower retromaxillary fat. Tumor abuts and may involve the base of the pterygoid plates and greater-lesser palatine foramina, however the pterygopalatine fossa, infraorbital canal, Meckel's caves, and cavernous sinuses appear grossly spared. A component of the tumor may spread submucosally underneath the left hard palate. \par \par Nonspecific asymmetric enhancement involving the left nasopharynx, left soft palate, uvula, and left tonsil appears stable compared to the prior CT. Correlate with direct visualization findings to exclude mucosal or submucosal spread of tumor versus lymphoid hypertrophy. \par \par Heterogeneous bilateral level 1 and level 2 lymph nodes are again noted suspicious for pathologic lymphadenopathy, stable in size compared to the CT examination. 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 \par \par past medical history reviewed no history of a heart attack [de-identified] : SCC [FreeTextEntry1] : retreatment carboplatin 1%/; 9%; 90% of dosing AUC 4; paclitaxel 150 mg/m2; pembrolizumab 200 mg [de-identified] : Patient is seen during chemotherapy infusion.. No toxic effects noted in his re exposure to a platin regimen.\par He has no rash no fever and no head ache.\par he still notes less appetite and that he has lost interest in eating over two months since the right buccal mass has grown. no abnormality is seen on the left buccal membrane

## 2020-04-29 DIAGNOSIS — R11.2 NAUSEA WITH VOMITING, UNSPECIFIED: ICD-10-CM

## 2020-04-29 DIAGNOSIS — Z51.11 ENCOUNTER FOR ANTINEOPLASTIC CHEMOTHERAPY: ICD-10-CM

## 2020-04-29 DIAGNOSIS — C76.0 MALIGNANT NEOPLASM OF HEAD, FACE AND NECK: ICD-10-CM

## 2020-04-29 DIAGNOSIS — E86.0 DEHYDRATION: ICD-10-CM

## 2020-05-14 ENCOUNTER — OUTPATIENT (OUTPATIENT)
Dept: OUTPATIENT SERVICES | Facility: HOSPITAL | Age: 80
LOS: 1 days | Discharge: HOME | End: 2020-05-14

## 2020-05-14 DIAGNOSIS — Z92.3 PERSONAL HISTORY OF IRRADIATION: Chronic | ICD-10-CM

## 2020-05-14 DIAGNOSIS — Z92.21 PERSONAL HISTORY OF ANTINEOPLASTIC CHEMOTHERAPY: Chronic | ICD-10-CM

## 2020-05-14 DIAGNOSIS — C06.9 MALIGNANT NEOPLASM OF MOUTH, UNSPECIFIED: ICD-10-CM

## 2020-05-19 ENCOUNTER — LABORATORY RESULT (OUTPATIENT)
Age: 80
End: 2020-05-19

## 2020-05-19 ENCOUNTER — RESULT REVIEW (OUTPATIENT)
Age: 80
End: 2020-05-19

## 2020-05-19 ENCOUNTER — APPOINTMENT (OUTPATIENT)
Dept: INFUSION THERAPY | Facility: HOSPITAL | Age: 80
End: 2020-05-19

## 2020-05-19 LAB
BASOPHILS # BLD AUTO: 0.06 K/UL — SIGNIFICANT CHANGE UP (ref 0–0.2)
BASOPHILS NFR BLD AUTO: 0.7 % — SIGNIFICANT CHANGE UP (ref 0–2)
EOSINOPHIL # BLD AUTO: 0.13 K/UL — SIGNIFICANT CHANGE UP (ref 0–0.5)
EOSINOPHIL NFR BLD AUTO: 1.6 % — SIGNIFICANT CHANGE UP (ref 0–6)
HCT VFR BLD CALC: 38.5 % — LOW (ref 39–50)
HGB BLD-MCNC: 13.1 G/DL — SIGNIFICANT CHANGE UP (ref 13–17)
IMM GRANULOCYTES NFR BLD AUTO: 3.7 % — HIGH (ref 0–1.5)
LYMPHOCYTES # BLD AUTO: 0.51 K/UL — LOW (ref 1–3.3)
LYMPHOCYTES # BLD AUTO: 6.1 % — LOW (ref 13–44)
MCHC RBC-ENTMCNC: 30.8 PG — SIGNIFICANT CHANGE UP (ref 27–34)
MCHC RBC-ENTMCNC: 34 GM/DL — SIGNIFICANT CHANGE UP (ref 32–36)
MCV RBC AUTO: 90.4 FL — SIGNIFICANT CHANGE UP (ref 80–100)
MONOCYTES # BLD AUTO: 0.72 K/UL — SIGNIFICANT CHANGE UP (ref 0–0.9)
MONOCYTES NFR BLD AUTO: 8.6 % — SIGNIFICANT CHANGE UP (ref 2–14)
NEUTROPHILS # BLD AUTO: 6.62 K/UL — SIGNIFICANT CHANGE UP (ref 1.8–7.4)
NEUTROPHILS NFR BLD AUTO: 79.3 % — HIGH (ref 43–77)
NRBC # BLD: 0 /100 WBCS — SIGNIFICANT CHANGE UP (ref 0–0)
PLATELET # BLD AUTO: 235 K/UL — SIGNIFICANT CHANGE UP (ref 150–400)
RBC # BLD: 4.26 M/UL — SIGNIFICANT CHANGE UP (ref 4.2–5.8)
RBC # FLD: 13.2 % — SIGNIFICANT CHANGE UP (ref 10.3–14.5)
WBC # BLD: 8.35 K/UL — SIGNIFICANT CHANGE UP (ref 3.8–10.5)
WBC # FLD AUTO: 8.35 K/UL — SIGNIFICANT CHANGE UP (ref 3.8–10.5)

## 2020-05-19 RX ORDER — SALIVA SUBSTITUTE COMBO NO.9
MOUTHWASH MUCOUS MEMBRANE DAILY
Qty: 1 | Refills: 2 | Status: ACTIVE | COMMUNITY
Start: 2020-05-19 | End: 1900-01-01

## 2020-05-20 DIAGNOSIS — Z51.11 ENCOUNTER FOR ANTINEOPLASTIC CHEMOTHERAPY: ICD-10-CM

## 2020-05-20 DIAGNOSIS — R11.2 NAUSEA WITH VOMITING, UNSPECIFIED: ICD-10-CM

## 2020-05-20 DIAGNOSIS — E86.0 DEHYDRATION: ICD-10-CM

## 2020-06-09 ENCOUNTER — LABORATORY RESULT (OUTPATIENT)
Age: 80
End: 2020-06-09

## 2020-06-09 ENCOUNTER — APPOINTMENT (OUTPATIENT)
Dept: INFUSION THERAPY | Facility: HOSPITAL | Age: 80
End: 2020-06-09

## 2020-06-09 ENCOUNTER — RESULT REVIEW (OUTPATIENT)
Age: 80
End: 2020-06-09

## 2020-06-09 ENCOUNTER — APPOINTMENT (OUTPATIENT)
Dept: HEMATOLOGY ONCOLOGY | Facility: CLINIC | Age: 80
End: 2020-06-09
Payer: MEDICARE

## 2020-06-09 LAB
BASOPHILS # BLD AUTO: 0.01 K/UL — SIGNIFICANT CHANGE UP (ref 0–0.2)
BASOPHILS NFR BLD AUTO: 0.2 % — SIGNIFICANT CHANGE UP (ref 0–2)
EOSINOPHIL # BLD AUTO: 0 K/UL — SIGNIFICANT CHANGE UP (ref 0–0.5)
EOSINOPHIL NFR BLD AUTO: 0 % — SIGNIFICANT CHANGE UP (ref 0–6)
HCT VFR BLD CALC: 35.5 % — LOW (ref 39–50)
HGB BLD-MCNC: 12.1 G/DL — LOW (ref 13–17)
IMM GRANULOCYTES NFR BLD AUTO: 1.3 % — SIGNIFICANT CHANGE UP (ref 0–1.5)
LYMPHOCYTES # BLD AUTO: 0.47 K/UL — LOW (ref 1–3.3)
LYMPHOCYTES # BLD AUTO: 9.9 % — LOW (ref 13–44)
MCHC RBC-ENTMCNC: 30.9 PG — SIGNIFICANT CHANGE UP (ref 27–34)
MCHC RBC-ENTMCNC: 34.1 GM/DL — SIGNIFICANT CHANGE UP (ref 32–36)
MCV RBC AUTO: 90.6 FL — SIGNIFICANT CHANGE UP (ref 80–100)
MONOCYTES # BLD AUTO: 0.39 K/UL — SIGNIFICANT CHANGE UP (ref 0–0.9)
MONOCYTES NFR BLD AUTO: 8.2 % — SIGNIFICANT CHANGE UP (ref 2–14)
NEUTROPHILS # BLD AUTO: 3.84 K/UL — SIGNIFICANT CHANGE UP (ref 1.8–7.4)
NEUTROPHILS NFR BLD AUTO: 80.4 % — HIGH (ref 43–77)
NRBC # BLD: 0 /100 WBCS — SIGNIFICANT CHANGE UP (ref 0–0)
PLATELET # BLD AUTO: 195 K/UL — SIGNIFICANT CHANGE UP (ref 150–400)
RBC # BLD: 3.92 M/UL — LOW (ref 4.2–5.8)
RBC # FLD: 14.6 % — HIGH (ref 10.3–14.5)
WBC # BLD: 4.77 K/UL — SIGNIFICANT CHANGE UP (ref 3.8–10.5)
WBC # FLD AUTO: 4.77 K/UL — SIGNIFICANT CHANGE UP (ref 3.8–10.5)

## 2020-06-09 PROCEDURE — 99215 OFFICE O/P EST HI 40 MIN: CPT

## 2020-06-09 NOTE — ASSESSMENT
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 Since restarting chemotherapy (carboplatin AUC 4 + paclitaxel 150 mg/m2 + pembrolizumab 200 mg IV) on 4/28/2020, patient has noted a reduction in tumor size. Despite his reported symptoms, he appeared well and his physical examination findings remain stable. He agreed to finish his last scheduled treatment on 6/30/2020 and will complete a follow-up MRI scan to assess for treatment response. He also agreed to use topical steroid cream to address his drug induced itchiness/skin rash, remain on levothyroxine and use Biotene mouth wash for dry mouth as directed. Lab studies were repeated today (CBC, CMP, T3, T4, TSH, ACTH, free cortisol). Return to the office in 1 month (after scans are completed). Seen and examined in conjunction with Dr. Pranay Alexis.

## 2020-06-09 NOTE — REVIEW OF SYSTEMS
[Fatigue] : fatigue [Recent Change In Weight] : ~T recent weight change [Dysphagia] : dysphagia [SOB on Exertion] : shortness of breath during exertion [Constipation] : constipation [Skin Rash] : skin rash [Negative] : Allergic/Immunologic [Fever] : no fever [Chills] : no chills [Night Sweats] : no night sweats [Eye Pain] : no eye pain [Red Eyes] : eyes not red [Dry Eyes] : no dryness of the eyes [Vision Problems] : no vision problems [Loss of Hearing] : no loss of hearing [Hoarseness] : no hoarseness [Nosebleeds] : no nosebleeds [Odynophagia] : no odynophagia [Chest Pain] : no chest pain [Mucosal Pain] : no mucosal pain [Palpitations] : no palpitations [Leg Claudication] : no intermittent leg claudication [Lower Ext Edema] : no lower extremity edema [Wheezing] : no wheezing [Shortness Of Breath] : no shortness of breath [Cough] : no cough [Abdominal Pain] : no abdominal pain [Dysuria] : no dysuria [Diarrhea] : no diarrhea [Vomiting] : no vomiting [Incontinence] : no incontinence [Joint Pain] : no joint pain [Joint Stiffness] : no joint stiffness [Muscle Pain] : no muscle pain [Skin Wound] : no skin wound [Dizziness] : no dizziness [Confused] : no confusion [Suicidal] : not suicidal [Difficulty Walking] : no difficulty walking [Fainting] : no fainting [Insomnia] : no insomnia [Anxiety] : no anxiety [Proptosis] : no proptosis [Depression] : no depression [Deepening Of The Voice] : no deepening of the voice [Hot Flashes] : no hot flashes [Muscle Weakness] : no muscle weakness [Easy Bleeding] : no tendency for easy bleeding [Easy Bruising] : no tendency for easy bruising [Swollen Glands] : no swollen glands

## 2020-06-09 NOTE — PHYSICAL EXAM
[Ambulatory and capable of all self care but unable to carry out any work activities] : Status 2- Ambulatory and capable of all self care but unable to carry out any work activities. Up and about more than 50% of waking hours [Obese] : obese [Normal] : affect appropriate [Mucositis] : no mucositis [Ulcers] : no ulcers [Vesicles] : no vesicles [Thrush] : no thrush [de-identified] : ventral hernia, liver edge palpable [de-identified] : decrease in right sided buccal tumor [de-identified] : decrease in bilateral cervical lymphadenopathy [de-identified] : excoriation noted on bilateral forearm, ecchymosis noted on right forearm

## 2020-06-09 NOTE — HISTORY OF PRESENT ILLNESS
[Disease: _____________________] : Disease: [unfilled] [T: ___] : T[unfilled] [M: ___] : M[unfilled] [N: ___] : N[unfilled] [AJCC Stage: ____] : AJCC Stage: [unfilled] [Therapy: ___] : Therapy: [unfilled] [Treatment Protocol] : Treatment Protocol [Cycle: ___] : Cycle: [unfilled] [de-identified] : SCC [de-identified] : 80 year old male presenting to [facility] for oncologic care. He was formerly under the care of Dr. Cassia Hayes (medical oncology). Patient noted the development of a "bump" in his left cheek in the beginning of 2019. He also admitted to oral cavity pain and bleeding but denied dysphagia, hoarseness or history of trauma. As it started to grow, he sought medical help and had further work up done. Patient saw Dr. 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 \par \par Patient was evaluated by Dr. Selvin Christianson (surgery) on 6/5/2019 and ordered imaging studies. A PET/CT scan on 6/17/2019 revealed the following: Hypermetabolic enhancing left upper gingival mass corresponds to patient's known squamous cell carcinoma and hypermetabolic bilateral level IB and IIA cervical lymph nodes compatible with metastatic disease. A CT neck scan done on the same day showed a large plaque like enhancement abnormality centered along the left maxillary gingivobuccal sulcus extending into the left maxillary sinus and toward the left infraorbital canal, and left greater palatine foramen.\par \par Patient was referred to Dr. Ho Roca (ENT) on 7/1/2019 and ordered a MRI head scan the next day, which showed extensive disease extending into the ITF, possible concern along the NPx and OPx and concerning LAD bilaterally.  However, FNA of the right neck nodes are all reported negative (FNA cervical node right 6/25/19 - negative for malignancy on level 1B, 2 and 3). The consensus was for induction chemotherapy + immunotherapy to assess for response of disease followed by surgery and/or adjuvant CXRT versus concurrent CXRT versus systemic palliation based on response.\par \par Patient was treated by Dr. Campbell for systemic therapy (s/p 3 cycles, Carboplatin/Paclitaxel + Keytruda every 3 weeks from 7/23/2019 - 9/4/201) and Dr. Josy Garibay (radiation oncology) for radiation therapy 70Gy (finished in December 2019) without any major issues. Follow-up MRI on 9/8/2019 demonstrated an almost complete response of disease. Shortly after completing treatment, he flew down to his winter home in [place] in December 2019. \par \par While in [place], patient felt a bump in his mouth in March 2020 and subsequently underwent a biopsy on the R side of the mouth by Dr. Kartik Graves on 3/2/2020 and results confirmed SCCa. He also completed had a PET scan; results demonstrated 3.9 cm X 2.8 cm FDG avid mass (SUV 8.0) left side of oral cavity; no description of lymph node or metastatic disease. Patient was advised to return to [place] for further management.  [de-identified] : 3/2/2020: right buccal biopsy: SCCa (recurrent disease); performed by Dr. Kartik Graves [FreeTextEntry1] : systemic therapy [de-identified] : Since resuming chemotherapy last month, patient noted decrease in tumor size in the right side of the buccal membrane. His swallowing function has mildly improved but his dry mouth remains persistent (believed to be radiation induced). He denied fever/chills, oral cavity bleeding, chest pain, nausea/vomiting, abdominal pain, diarrhea. Patient also admitted to skin rash + itchiness of his bilateral arms for the past 1 month, relieved with topical steroid.

## 2020-06-24 ENCOUNTER — OUTPATIENT (OUTPATIENT)
Dept: OUTPATIENT SERVICES | Facility: HOSPITAL | Age: 80
LOS: 1 days | Discharge: HOME | End: 2020-06-24

## 2020-06-24 DIAGNOSIS — Z92.21 PERSONAL HISTORY OF ANTINEOPLASTIC CHEMOTHERAPY: Chronic | ICD-10-CM

## 2020-06-24 DIAGNOSIS — C06.9 MALIGNANT NEOPLASM OF MOUTH, UNSPECIFIED: ICD-10-CM

## 2020-06-24 DIAGNOSIS — Z92.3 PERSONAL HISTORY OF IRRADIATION: Chronic | ICD-10-CM

## 2020-06-30 ENCOUNTER — RESULT REVIEW (OUTPATIENT)
Age: 80
End: 2020-06-30

## 2020-06-30 ENCOUNTER — LABORATORY RESULT (OUTPATIENT)
Age: 80
End: 2020-06-30

## 2020-06-30 ENCOUNTER — APPOINTMENT (OUTPATIENT)
Dept: HEMATOLOGY ONCOLOGY | Facility: CLINIC | Age: 80
End: 2020-06-30
Payer: MEDICARE

## 2020-06-30 ENCOUNTER — APPOINTMENT (OUTPATIENT)
Dept: INFUSION THERAPY | Facility: HOSPITAL | Age: 80
End: 2020-06-30

## 2020-06-30 LAB
BASOPHILS # BLD AUTO: 0.02 K/UL — SIGNIFICANT CHANGE UP (ref 0–0.2)
BASOPHILS NFR BLD AUTO: 0.3 % — SIGNIFICANT CHANGE UP (ref 0–2)
EOSINOPHIL # BLD AUTO: 0 K/UL — SIGNIFICANT CHANGE UP (ref 0–0.5)
EOSINOPHIL NFR BLD AUTO: 0 % — SIGNIFICANT CHANGE UP (ref 0–6)
HCT VFR BLD CALC: 34.7 % — LOW (ref 39–50)
HGB BLD-MCNC: 12.1 G/DL — LOW (ref 13–17)
IMM GRANULOCYTES NFR BLD AUTO: 1.6 % — HIGH (ref 0–1.5)
LYMPHOCYTES # BLD AUTO: 0.57 K/UL — LOW (ref 1–3.3)
LYMPHOCYTES # BLD AUTO: 8.4 % — LOW (ref 13–44)
MCHC RBC-ENTMCNC: 31.4 PG — SIGNIFICANT CHANGE UP (ref 27–34)
MCHC RBC-ENTMCNC: 34.9 GM/DL — SIGNIFICANT CHANGE UP (ref 32–36)
MCV RBC AUTO: 90.1 FL — SIGNIFICANT CHANGE UP (ref 80–100)
MONOCYTES # BLD AUTO: 0.63 K/UL — SIGNIFICANT CHANGE UP (ref 0–0.9)
MONOCYTES NFR BLD AUTO: 9.3 % — SIGNIFICANT CHANGE UP (ref 2–14)
NEUTROPHILS # BLD AUTO: 5.44 K/UL — SIGNIFICANT CHANGE UP (ref 1.8–7.4)
NEUTROPHILS NFR BLD AUTO: 80.4 % — HIGH (ref 43–77)
NRBC # BLD: 0 /100 WBCS — SIGNIFICANT CHANGE UP (ref 0–0)
PLATELET # BLD AUTO: 235 K/UL — SIGNIFICANT CHANGE UP (ref 150–400)
RBC # BLD: 3.85 M/UL — LOW (ref 4.2–5.8)
RBC # FLD: 15.9 % — HIGH (ref 10.3–14.5)
WBC # BLD: 6.77 K/UL — SIGNIFICANT CHANGE UP (ref 3.8–10.5)
WBC # FLD AUTO: 6.77 K/UL — SIGNIFICANT CHANGE UP (ref 3.8–10.5)

## 2020-06-30 PROCEDURE — 99214 OFFICE O/P EST MOD 30 MIN: CPT

## 2020-06-30 NOTE — PHYSICAL EXAM
[Mucositis] : no mucositis [Ulcers] : no ulcers [Vesicles] : no vesicles [Thrush] : no thrush [de-identified] : 1 cm X 2 cm right buccal tumor is seen; now flattened no thrush,  [de-identified] : ventral hernia, liver edge palpable

## 2020-06-30 NOTE — CONSULT LETTER
[FreeTextEntry2] : Ho Roca MD\par Otolaryngology\par [address] [facility] \par [place] [id number] [FreeTextEntry3] : Pranay Alexis

## 2020-06-30 NOTE — HISTORY OF PRESENT ILLNESS
[de-identified] : Mr. GORDON BROMBERG is a pleasant 79 year old man who comes here today after having finished radiation therapy. 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 As the bump started to grow, he sought medical help and had the work up done below. \par \par PET-CT 6/17/19\par 1. Hypermetabolic enhancing left upper gingival mass corresponds to patient's known squamous cell carcinoma. Please see report of contrast-enhanced CT of same day for further description of the primary lesion. \par 2. Nonspecific, asymmetric hypermetabolism in left nasopharynx and left greater than right base of tongue. Please correlate with direct visualization. \par 3. Hypermetabolic bilateral level IB and IIA cervical lymph nodes are compatible with metastatic disease. Ultrasound-guided percutaneous needle biopsy may be obtained for confirmation. \par 4. Non FDG-avid right upper lobe nodular opacity is nonspecific. Correlate clinically for infection. A dedicated CT of chest is recommended in one month for further evaluation. \par 5. Markedly enlarged prostate gland without associated hypermetabolism.\par \par CT neck 6/17/19\par Large plaque like enhancement abnormality centered along the left maxillary gingivobuccal sulcus extending into the left maxillary sinus and toward the left infraorbital canal, and left greater palatine foramen as described. Contrast-enhanced MR imaging of the skull base can be done to evaluate for possible perineural spread of disease. \par \par Contiguous versus possible synchronous additional malignant disease seen along the left nasopharynx, extending to the uvula. Left asymmetric lingual tonsillar enhancement extending to the vallecula which may represent tonsillar hypertrophy though additional neoplastic disease is not excluded. \par \par Bilateral pathologic level I, left II, bilateral III lymphadenopathy. \par \par MRI brain 7/2/19\par A large bulky mass with associated bony destruction is again noted involving the left maxillary alveolus, floor of the left maxillary sinus, and lower aspect of the posterior lateral wall of the left maxillary sinus, with extension to the left buccal space across the gingival buccal sulcus. The tumor extends into the lower retromaxillary fat. Tumor abuts and may involve the base of the pterygoid plates and greater-lesser palatine foramina, however the pterygopalatine fossa, infraorbital canal, Meckel's caves, and cavernous sinuses appear grossly spared. A component of the tumor may spread submucosally underneath the left hard palate. \par \par Nonspecific asymmetric enhancement involving the left nasopharynx, left soft palate, uvula, and left tonsil appears stable compared to the prior CT. Correlate with direct visualization findings to exclude mucosal or submucosal spread of tumor versus lymphoid hypertrophy. \par \par Heterogeneous bilateral level 1 and level 2 lymph nodes are again noted suspicious for pathologic lymphadenopathy, stable in size compared to the CT examination. 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 \par \par past medical history reviewed no history of a heart attack [de-identified] : SCC [FreeTextEntry1] : retreatment carboplatin 1%/; 9%; 90% of dosing AUC 4; paclitaxel 150 mg/m2; pembrolizumab 200 mg now cycle 6 June 2020 [de-identified] : He is feeling well. no head ache and no mouth bleeding. He still feels irregularity in the right buccal are. He notes some sloughing of tissue. No fever no cough fei syumptoms consistent with COVID 19 infecetion

## 2020-06-30 NOTE — ASSESSMENT
[FreeTextEntry1] : 80 year old with recurrence of squamous cell carcinoma in the right buccal area.\par The patient is showing a good response to immunotherapy and chemotherapy.\par There is reduction of size of tumor and some flattening of the base of the disease.\par The patient is asked to see Dr Roca in follow up assessment

## 2020-07-01 DIAGNOSIS — R11.2 NAUSEA WITH VOMITING, UNSPECIFIED: ICD-10-CM

## 2020-07-01 DIAGNOSIS — Z51.11 ENCOUNTER FOR ANTINEOPLASTIC CHEMOTHERAPY: ICD-10-CM

## 2020-07-21 ENCOUNTER — APPOINTMENT (OUTPATIENT)
Age: 80
End: 2020-07-21

## 2020-07-21 ENCOUNTER — OUTPATIENT (OUTPATIENT)
Dept: OUTPATIENT SERVICES | Facility: HOSPITAL | Age: 80
LOS: 1 days | End: 2020-07-21
Payer: MEDICARE

## 2020-07-21 ENCOUNTER — RESULT REVIEW (OUTPATIENT)
Age: 80
End: 2020-07-21

## 2020-07-21 DIAGNOSIS — C06.0 MALIGNANT NEOPLASM OF CHEEK MUCOSA: ICD-10-CM

## 2020-07-21 DIAGNOSIS — Z92.3 PERSONAL HISTORY OF IRRADIATION: Chronic | ICD-10-CM

## 2020-07-21 DIAGNOSIS — C03.0 MALIGNANT NEOPLASM OF UPPER GUM: ICD-10-CM

## 2020-07-21 DIAGNOSIS — Z92.21 PERSONAL HISTORY OF ANTINEOPLASTIC CHEMOTHERAPY: Chronic | ICD-10-CM

## 2020-07-21 PROCEDURE — 70543 MRI ORBT/FAC/NCK W/O &W/DYE: CPT

## 2020-07-21 PROCEDURE — 70543 MRI ORBT/FAC/NCK W/O &W/DYE: CPT | Mod: 26

## 2020-07-21 PROCEDURE — A9585: CPT

## 2020-07-24 ENCOUNTER — RX RENEWAL (OUTPATIENT)
Age: 80
End: 2020-07-24

## 2020-07-25 ENCOUNTER — OUTPATIENT (OUTPATIENT)
Dept: OUTPATIENT SERVICES | Facility: HOSPITAL | Age: 80
LOS: 1 days | Discharge: HOME | End: 2020-07-25

## 2020-07-25 DIAGNOSIS — Z92.3 PERSONAL HISTORY OF IRRADIATION: Chronic | ICD-10-CM

## 2020-07-25 DIAGNOSIS — C06.9 MALIGNANT NEOPLASM OF MOUTH, UNSPECIFIED: ICD-10-CM

## 2020-07-25 DIAGNOSIS — Z92.21 PERSONAL HISTORY OF ANTINEOPLASTIC CHEMOTHERAPY: Chronic | ICD-10-CM

## 2020-07-25 DIAGNOSIS — C76.0 MALIGNANT NEOPLASM OF HEAD, FACE AND NECK: ICD-10-CM

## 2020-07-30 ENCOUNTER — RESULT REVIEW (OUTPATIENT)
Age: 80
End: 2020-07-30

## 2020-07-30 ENCOUNTER — APPOINTMENT (OUTPATIENT)
Dept: HEMATOLOGY ONCOLOGY | Facility: CLINIC | Age: 80
End: 2020-07-30
Payer: MEDICARE

## 2020-07-30 VITALS
OXYGEN SATURATION: 97 % | WEIGHT: 169.73 LBS | HEART RATE: 64 BPM | DIASTOLIC BLOOD PRESSURE: 87 MMHG | SYSTOLIC BLOOD PRESSURE: 157 MMHG | RESPIRATION RATE: 18 BRPM | BODY MASS INDEX: 28.28 KG/M2 | TEMPERATURE: 97.8 F

## 2020-07-30 DIAGNOSIS — R43.2 PARAGEUSIA: ICD-10-CM

## 2020-07-30 DIAGNOSIS — H40.9 UNSPECIFIED GLAUCOMA: ICD-10-CM

## 2020-07-30 DIAGNOSIS — R53.83 OTHER FATIGUE: ICD-10-CM

## 2020-07-30 PROCEDURE — 99214 OFFICE O/P EST MOD 30 MIN: CPT

## 2020-07-31 LAB — SARS-COV-2 RNA SPEC QL NAA+PROBE: SIGNIFICANT CHANGE UP

## 2020-08-03 NOTE — PHYSICAL EXAM
[Ambulatory and capable of all self care but unable to carry out any work activities] : Status 2- Ambulatory and capable of all self care but unable to carry out any work activities. Up and about more than 50% of waking hours [Obese] : obese [Normal] : affect appropriate [Mucositis] : no mucositis [Ulcers] : no ulcers [Vesicles] : no vesicles [Thrush] : no thrush [de-identified] : decrease in bilateral cervical lymphadenopathy [de-identified] : ventral hernia, liver edge palpable [de-identified] : right sided buccal tumor present [de-identified] : excoriation noted on bilateral forearm, ecchymosis noted on right forearm

## 2020-08-03 NOTE — HISTORY OF PRESENT ILLNESS
[Disease: _____________________] : Disease: [unfilled] [T: ___] : T[unfilled] [N: ___] : N[unfilled] [M: ___] : M[unfilled] [AJCC Stage: ____] : AJCC Stage: [unfilled] [Treatment Protocol] : Treatment Protocol [de-identified] : 80 year old male presenting to [facility] for oncologic care. He was formerly under the care of Dr. Cassia Hayes (medical oncology). Patient noted the development of a "bump" in his left cheek in the beginning of 2019. He also admitted to oral cavity pain and bleeding but denied dysphagia, hoarseness or history of trauma. As it started to grow, he sought medical help and had further work up done. Patient saw Dr. 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 \par \par Patient was evaluated by Dr. Selvin Christianson (surgery) on 6/5/2019 and ordered imaging studies. A PET/CT scan on 6/17/2019 revealed the following: Hypermetabolic enhancing left upper gingival mass corresponds to patient's known squamous cell carcinoma and hypermetabolic bilateral level IB and IIA cervical lymph nodes compatible with metastatic disease. A CT neck scan done on the same day showed a large plaque like enhancement abnormality centered along the left maxillary gingivobuccal sulcus extending into the left maxillary sinus and toward the left infraorbital canal, and left greater palatine foramen.\par \par Patient was referred to Dr. Ho Roca (ENT) on 7/1/2019 and ordered a MRI head scan the next day, which showed extensive disease extending into the ITF, possible concern along the NPx and OPx and concerning LAD bilaterally.  However, FNA of the right neck nodes are all reported negative (FNA cervical node right 6/25/19 - negative for malignancy on level 1B, 2 and 3). The consensus was for induction chemotherapy + immunotherapy to assess for response of disease followed by surgery and/or adjuvant CXRT versus concurrent CXRT versus systemic palliation based on response.\par \par Patient was treated by Dr. Campbell for systemic therapy (s/p 3 cycles, Carboplatin/Paclitaxel + Keytruda every 3 weeks from 7/23/2019 - 9/4/201) and Dr. Josy Garibay (radiation oncology) for radiation therapy 70Gy (finished in December 2019) without any major issues. Follow-up MRI on 9/8/2019 demonstrated an almost complete response of disease. Shortly after completing treatment, he flew down to his winter home in [place] in December 2019. \par \par While in [place], patient felt a bump in his mouth in March 2020 and subsequently underwent a biopsy on the R side of the mouth by Dr. Kartik Graves on 3/2/2020 and results confirmed SCCa. He also completed had a PET scan; results demonstrated 3.9 cm X 2.8 cm FDG avid mass (SUV 8.0) left side of oral cavity; no description of lymph node or metastatic disease. Patient was advised to return to [place] for further management.  [de-identified] : 3/2/2020: right buccal biopsy: SCCa (recurrent disease); performed by Dr. Kartik Graves [de-identified] : SCC [de-identified] : Patient continues to note difficulty swallowing, dry mouth, lack of appetite and unintentional weight loss. He completed his last scheduled treatment on 6/30/2020. His itchiness is under control with topical steroid cream.  [FreeTextEntry1] : s/p 7 cycles Carboplatin + Paclitaxel + Keytruda

## 2020-08-03 NOTE — ASSESSMENT
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 Patient has now completed 7 cycles of carboplatin AUC 4 + paclitaxel 150 mg/m2 + pembrolizumab 200 mg IV (last given on 6/30/2020). \par \par His MRI scan from 7/21/2020 demonstrated the following: \par - Increased fullness and enhancement right buccinator, and right parotid Stensen's duct insertion duct concerning for tumor progression involving right temporalis muscle with extension as above with of abnormal soft tissue irregularity and enhancement of parotid glands, although this may reflect post treatment change, tumor involvement and perineural involvement also not excluded.\par - Slightly decreased soft tissue thickening and enhancement, left gingivobuccal sulcus, left lateral oral cavity, left infratemporal fossa suggesting response to treatment with continued soft tissue reticulation likely from post treatment sequela.\par \par Patient agreed on the following: \par - continue immunotherapy; scheduled for Keytruda next week on 8/4/2020. \par - Use topical steroid cream to address his drug induced itchiness/skin rash. \par - Remain on levothyroxine as directed. \par - Use Biotene mouth wash for dry mouth. \par - COVID-19 Nasopharyngeal swab testing completed today. \par - Return to the office in 5-6 weeks. Seen and examined in conjunction with Robert MOORE.

## 2020-08-03 NOTE — REVIEW OF SYSTEMS
[Fatigue] : fatigue [Recent Change In Weight] : ~T recent weight change [Dysphagia] : dysphagia [SOB on Exertion] : shortness of breath during exertion [Constipation] : constipation [Skin Rash] : skin rash [Negative] : Endocrine [Fever] : no fever [Chills] : no chills [Eye Pain] : no eye pain [Night Sweats] : no night sweats [Dry Eyes] : no dryness of the eyes [Red Eyes] : eyes not red [Nosebleeds] : no nosebleeds [Vision Problems] : no vision problems [Loss of Hearing] : no loss of hearing [Hoarseness] : no hoarseness [Odynophagia] : no odynophagia [Mucosal Pain] : no mucosal pain [Chest Pain] : no chest pain [Leg Claudication] : no intermittent leg claudication [Palpitations] : no palpitations [Shortness Of Breath] : no shortness of breath [Lower Ext Edema] : no lower extremity edema [Abdominal Pain] : no abdominal pain [Cough] : no cough [Wheezing] : no wheezing [Vomiting] : no vomiting [Diarrhea] : no diarrhea [Incontinence] : no incontinence [Dysuria] : no dysuria [Joint Pain] : no joint pain [Muscle Pain] : no muscle pain [Joint Stiffness] : no joint stiffness [Confused] : no confusion [Skin Wound] : no skin wound [Dizziness] : no dizziness [Fainting] : no fainting [Suicidal] : not suicidal [Difficulty Walking] : no difficulty walking [Depression] : no depression [Insomnia] : no insomnia [Anxiety] : no anxiety [Proptosis] : no proptosis [Hot Flashes] : no hot flashes [Deepening Of The Voice] : no deepening of the voice [Muscle Weakness] : no muscle weakness [Easy Bruising] : no tendency for easy bruising [Easy Bleeding] : no tendency for easy bleeding [Swollen Glands] : no swollen glands

## 2020-08-04 ENCOUNTER — APPOINTMENT (OUTPATIENT)
Dept: INFUSION THERAPY | Facility: HOSPITAL | Age: 80
End: 2020-08-04

## 2020-08-05 DIAGNOSIS — R11.2 NAUSEA WITH VOMITING, UNSPECIFIED: ICD-10-CM

## 2020-08-05 DIAGNOSIS — Z51.11 ENCOUNTER FOR ANTINEOPLASTIC CHEMOTHERAPY: ICD-10-CM

## 2020-08-18 ENCOUNTER — OUTPATIENT (OUTPATIENT)
Dept: OUTPATIENT SERVICES | Facility: HOSPITAL | Age: 80
LOS: 1 days | Discharge: HOME | End: 2020-08-18

## 2020-08-18 DIAGNOSIS — C06.9 MALIGNANT NEOPLASM OF MOUTH, UNSPECIFIED: ICD-10-CM

## 2020-08-18 DIAGNOSIS — Z92.21 PERSONAL HISTORY OF ANTINEOPLASTIC CHEMOTHERAPY: Chronic | ICD-10-CM

## 2020-08-18 DIAGNOSIS — Z92.3 PERSONAL HISTORY OF IRRADIATION: Chronic | ICD-10-CM

## 2020-08-25 ENCOUNTER — LABORATORY RESULT (OUTPATIENT)
Age: 80
End: 2020-08-25

## 2020-08-25 ENCOUNTER — APPOINTMENT (OUTPATIENT)
Dept: INFUSION THERAPY | Facility: HOSPITAL | Age: 80
End: 2020-08-25

## 2020-08-26 DIAGNOSIS — R11.2 NAUSEA WITH VOMITING, UNSPECIFIED: ICD-10-CM

## 2020-08-26 DIAGNOSIS — Z51.11 ENCOUNTER FOR ANTINEOPLASTIC CHEMOTHERAPY: ICD-10-CM

## 2020-09-01 ENCOUNTER — APPOINTMENT (OUTPATIENT)
Dept: OTOLARYNGOLOGY | Facility: CLINIC | Age: 80
End: 2020-09-01
Payer: MEDICARE

## 2020-09-01 VITALS
HEIGHT: 65 IN | HEART RATE: 60 BPM | WEIGHT: 170 LBS | BODY MASS INDEX: 28.32 KG/M2 | DIASTOLIC BLOOD PRESSURE: 80 MMHG | SYSTOLIC BLOOD PRESSURE: 135 MMHG

## 2020-09-01 PROCEDURE — 99214 OFFICE O/P EST MOD 30 MIN: CPT | Mod: 25

## 2020-09-01 PROCEDURE — 31575 DIAGNOSTIC LARYNGOSCOPY: CPT

## 2020-09-01 NOTE — PROCEDURE
[Trismus] : trismus preventing mirror examination [None] : none [Flexible Endoscope] : examined with the flexible endoscope [Serial Number: ___] : Serial Number: [unfilled] [de-identified] : No lesions in the NPx, OPx, HPx or larynx.  Stable posttreatment changes, VC are mobile, airway patent.\par

## 2020-09-01 NOTE — PHYSICAL EXAM
[de-identified] : Posttreatment changes, no LAD. [Laryngoscopy Performed] : laryngoscopy was performed, see procedure section for findings [FreeTextEntry1] : Significant improvement in the primary disease with great decrease in size of the lesion but possible residual disease along the gingivolabial sulcus on the left maxilla vs fibrosis from treatment.  There is significant progression of the right buccal lesion with erosion through the overlying skin now and significant lymphedema along the right hemiface.   [Normal] : no rashes [de-identified] : No other LAD.

## 2020-09-01 NOTE — HISTORY OF PRESENT ILLNESS
[de-identified] : 80 year male with history of  L maxilla SCCa presents for follow up. Currently on chemotherapy. MRI face and neck 07/21/2020. Reports lump on right cheek noticed 1 week ago a few days after his chemo treatment. States increased in size. States has some pain with white discharge. Reports dysphagia with solids. Denies odynophagia, dysphagia.

## 2020-09-04 ENCOUNTER — OUTPATIENT (OUTPATIENT)
Dept: OUTPATIENT SERVICES | Facility: HOSPITAL | Age: 80
LOS: 1 days | Discharge: HOME | End: 2020-09-04
Payer: MEDICARE

## 2020-09-04 ENCOUNTER — APPOINTMENT (OUTPATIENT)
Dept: RADIATION ONCOLOGY | Facility: CLINIC | Age: 80
End: 2020-09-04
Payer: MEDICARE

## 2020-09-04 VITALS
WEIGHT: 167.11 LBS | TEMPERATURE: 97.2 F | DIASTOLIC BLOOD PRESSURE: 76 MMHG | OXYGEN SATURATION: 97 % | HEART RATE: 74 BPM | RESPIRATION RATE: 14 BRPM | BODY MASS INDEX: 27.81 KG/M2 | SYSTOLIC BLOOD PRESSURE: 135 MMHG

## 2020-09-04 DIAGNOSIS — Z92.3 PERSONAL HISTORY OF IRRADIATION: Chronic | ICD-10-CM

## 2020-09-04 DIAGNOSIS — Z92.21 PERSONAL HISTORY OF ANTINEOPLASTIC CHEMOTHERAPY: Chronic | ICD-10-CM

## 2020-09-04 PROCEDURE — 77263 THER RADIOLOGY TX PLNG CPLX: CPT

## 2020-09-04 PROCEDURE — 99215 OFFICE O/P EST HI 40 MIN: CPT | Mod: GC

## 2020-09-06 NOTE — DISEASE MANAGEMENT
[Clinical] : TNM Stage: c [ROSE] : ROSE [MTNM] : 0 [NTNM] : 2b [TTNM] : 4a [de-identified] : 70 Gy [de-identified] : maxillary sinus

## 2020-09-06 NOTE — HISTORY OF PRESENT ILLNESS
[FreeTextEntry1] : Mr. 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 Alexis.\par \par Evidence of recurrence initially noted on PET/CT 3/2020 at [facility]: 3.9x2.8 avid mass in oral cavity with SUV 8 with focus in tongue also avid and suspicious for ca\par \par Buccal biopsy 3/2020: + for recurrent SCCA\par \par 4/2020 MR face/neck:\par Increased fullness and enhancement right buccinator, and right parotid Stensen's duct insertion duct concerning for tumor progression involving right temporalis muscle with extension as above with of abnormal soft tissue irregularity and enhancement of parotid glands, although this may reflect post treatment change, tumor involvement and perineural involvement also not excluded.\par \par Slightly decreased soft tissue thickening and enhancement, left gingivobuccal sulcus, left lateral oral cavity, left infratemporal fossa suggesting response to treatment with continued soft tissue reticulation likely from post treatment sequela.\par \par He saw Dr. Roca in 4/2020 who felt that infratemporal extension would make resection with clear margins difficult/morbid, and the decision was made to pursue induction CHT.\par \par 7/23/20: MRI face/neck\par IMPRESSION:  Increased soft tissue thickening and enhancement involving the left gingivobuccal sulcus, lateral wall of the oral cavity and infratemporal fossa when compared with the most recent exam. While these may represent post radiation changes, the possibility of residual or progressing tumor is not excluded.\par \par Soft tissue thickening and enhancement involving the right lateral oral cavity wall/buccinator muscle when compared with the prior exam which also may represent posttreatment changes or tumor involvement.\par \par Increasing soft tissue swelling and fat plane reticulation of the superficial tissues of the face particularly on the left likely secondary to post radiation change.\par \par He has now undergone completed 7 cycles of carboplatin AUC 4 + paclitaxel 150 mg/m2 + pembrolizumab 200 mg IV (last given on 6/30/2020). He last saw Dr. Alexis 7/30/20 who has been continuing keytruda, scheduled through 10/23. \par \par Today in clinic he presents complaining of a painless but enlarging right buccal mass which has been focally eroding through the skin for 5 days. It has not been bleeding, but he has trouble swallowing.

## 2020-09-06 NOTE — PHYSICAL EXAM
[General Appearance - Well Developed] : well developed [General Appearance - In No Acute Distress] : in no acute distress [Normal] : oriented to person, place and time, the affect was normal, the mood was normal and not anxious [de-identified] : 8cm right buccal mass with focus of skin erosion measuring 2cm, limited oral examination due to trismus.

## 2020-09-10 PROCEDURE — 77290 THER RAD SIMULAJ FIELD CPLX: CPT | Mod: 26

## 2020-09-10 PROCEDURE — 77334 RADIATION TREATMENT AID(S): CPT | Mod: 26

## 2020-09-15 ENCOUNTER — APPOINTMENT (OUTPATIENT)
Dept: INFUSION THERAPY | Facility: HOSPITAL | Age: 80
End: 2020-09-15

## 2020-09-15 ENCOUNTER — RESULT REVIEW (OUTPATIENT)
Age: 80
End: 2020-09-15

## 2020-09-15 ENCOUNTER — LABORATORY RESULT (OUTPATIENT)
Age: 80
End: 2020-09-15

## 2020-09-15 LAB
BASOPHILS # BLD AUTO: 0.02 K/UL — SIGNIFICANT CHANGE UP (ref 0–0.2)
BASOPHILS NFR BLD AUTO: 0.2 % — SIGNIFICANT CHANGE UP (ref 0–2)
EOSINOPHIL # BLD AUTO: 0.01 K/UL — SIGNIFICANT CHANGE UP (ref 0–0.5)
EOSINOPHIL NFR BLD AUTO: 0.1 % — SIGNIFICANT CHANGE UP (ref 0–6)
HCT VFR BLD CALC: 36.6 % — LOW (ref 39–50)
HGB BLD-MCNC: 11.6 G/DL — LOW (ref 13–17)
IMM GRANULOCYTES NFR BLD AUTO: 1.7 % — HIGH (ref 0–1.5)
LYMPHOCYTES # BLD AUTO: 0.54 K/UL — LOW (ref 1–3.3)
LYMPHOCYTES # BLD AUTO: 6.5 % — LOW (ref 13–44)
MCHC RBC-ENTMCNC: 31.4 PG — SIGNIFICANT CHANGE UP (ref 27–34)
MCHC RBC-ENTMCNC: 31.7 G/DL — LOW (ref 32–36)
MCV RBC AUTO: 99.2 FL — SIGNIFICANT CHANGE UP (ref 80–100)
MONOCYTES # BLD AUTO: 0.71 K/UL — SIGNIFICANT CHANGE UP (ref 0–0.9)
MONOCYTES NFR BLD AUTO: 8.6 % — SIGNIFICANT CHANGE UP (ref 2–14)
NEUTROPHILS # BLD AUTO: 6.86 K/UL — SIGNIFICANT CHANGE UP (ref 1.8–7.4)
NEUTROPHILS NFR BLD AUTO: 82.9 % — HIGH (ref 43–77)
NRBC # BLD: 0 /100 WBCS — SIGNIFICANT CHANGE UP (ref 0–0)
PLATELET # BLD AUTO: 399 K/UL — SIGNIFICANT CHANGE UP (ref 150–400)
RBC # BLD: 3.69 M/UL — LOW (ref 4.2–5.8)
RBC # FLD: 14.3 % — SIGNIFICANT CHANGE UP (ref 10.3–14.5)
WBC # BLD: 8.28 K/UL — SIGNIFICANT CHANGE UP (ref 3.8–10.5)
WBC # FLD AUTO: 8.28 K/UL — SIGNIFICANT CHANGE UP (ref 3.8–10.5)

## 2020-09-28 ENCOUNTER — OUTPATIENT (OUTPATIENT)
Dept: OUTPATIENT SERVICES | Facility: HOSPITAL | Age: 80
LOS: 1 days | Discharge: HOME | End: 2020-09-28

## 2020-09-28 DIAGNOSIS — Z92.21 PERSONAL HISTORY OF ANTINEOPLASTIC CHEMOTHERAPY: Chronic | ICD-10-CM

## 2020-09-28 DIAGNOSIS — Z92.3 PERSONAL HISTORY OF IRRADIATION: Chronic | ICD-10-CM

## 2020-09-28 DIAGNOSIS — C76.0 MALIGNANT NEOPLASM OF HEAD, FACE AND NECK: ICD-10-CM

## 2020-09-28 PROCEDURE — 77300 RADIATION THERAPY DOSE PLAN: CPT | Mod: 26

## 2020-09-28 PROCEDURE — 77334 RADIATION TREATMENT AID(S): CPT | Mod: 26

## 2020-09-28 PROCEDURE — 77295 3-D RADIOTHERAPY PLAN: CPT | Mod: 26

## 2020-10-06 ENCOUNTER — APPOINTMENT (OUTPATIENT)
Dept: INFUSION THERAPY | Facility: HOSPITAL | Age: 80
End: 2020-10-06

## 2020-10-06 ENCOUNTER — LABORATORY RESULT (OUTPATIENT)
Age: 80
End: 2020-10-06

## 2020-10-06 ENCOUNTER — RESULT REVIEW (OUTPATIENT)
Age: 80
End: 2020-10-06

## 2020-10-06 ENCOUNTER — APPOINTMENT (OUTPATIENT)
Dept: HEMATOLOGY ONCOLOGY | Facility: CLINIC | Age: 80
End: 2020-10-06
Payer: MEDICARE

## 2020-10-06 LAB
BASOPHILS # BLD AUTO: 0.02 K/UL — SIGNIFICANT CHANGE UP (ref 0–0.2)
BASOPHILS NFR BLD AUTO: 0.2 % — SIGNIFICANT CHANGE UP (ref 0–2)
EOSINOPHIL # BLD AUTO: 0.01 K/UL — SIGNIFICANT CHANGE UP (ref 0–0.5)
EOSINOPHIL NFR BLD AUTO: 0.1 % — SIGNIFICANT CHANGE UP (ref 0–6)
HCT VFR BLD CALC: 34.5 % — LOW (ref 39–50)
HGB BLD-MCNC: 11 G/DL — LOW (ref 13–17)
IMM GRANULOCYTES NFR BLD AUTO: 1.3 % — SIGNIFICANT CHANGE UP (ref 0–1.5)
LYMPHOCYTES # BLD AUTO: 0.59 K/UL — LOW (ref 1–3.3)
LYMPHOCYTES # BLD AUTO: 6.9 % — LOW (ref 13–44)
MCHC RBC-ENTMCNC: 30.6 PG — SIGNIFICANT CHANGE UP (ref 27–34)
MCHC RBC-ENTMCNC: 31.9 G/DL — LOW (ref 32–36)
MCV RBC AUTO: 96.1 FL — SIGNIFICANT CHANGE UP (ref 80–100)
MONOCYTES # BLD AUTO: 0.8 K/UL — SIGNIFICANT CHANGE UP (ref 0–0.9)
MONOCYTES NFR BLD AUTO: 9.3 % — SIGNIFICANT CHANGE UP (ref 2–14)
NEUTROPHILS # BLD AUTO: 7.04 K/UL — SIGNIFICANT CHANGE UP (ref 1.8–7.4)
NEUTROPHILS NFR BLD AUTO: 82.2 % — HIGH (ref 43–77)
NRBC # BLD: 0 /100 WBCS — SIGNIFICANT CHANGE UP (ref 0–0)
PLATELET # BLD AUTO: 395 K/UL — SIGNIFICANT CHANGE UP (ref 150–400)
RBC # BLD: 3.59 M/UL — LOW (ref 4.2–5.8)
RBC # FLD: 14.1 % — SIGNIFICANT CHANGE UP (ref 10.3–14.5)
WBC # BLD: 8.57 K/UL — SIGNIFICANT CHANGE UP (ref 3.8–10.5)
WBC # FLD AUTO: 8.57 K/UL — SIGNIFICANT CHANGE UP (ref 3.8–10.5)

## 2020-10-06 PROCEDURE — 99214 OFFICE O/P EST MOD 30 MIN: CPT

## 2020-10-07 DIAGNOSIS — Z51.11 ENCOUNTER FOR ANTINEOPLASTIC CHEMOTHERAPY: ICD-10-CM

## 2020-10-07 DIAGNOSIS — R11.2 NAUSEA WITH VOMITING, UNSPECIFIED: ICD-10-CM

## 2020-10-07 NOTE — REVIEW OF SYSTEMS
[Fatigue] : fatigue [Recent Change In Weight] : ~T recent weight change [Dysphagia] : dysphagia [SOB on Exertion] : shortness of breath during exertion [Constipation] : constipation [Skin Rash] : skin rash [Negative] : Allergic/Immunologic [Fever] : no fever [Chills] : no chills [Night Sweats] : no night sweats [Eye Pain] : no eye pain [Red Eyes] : eyes not red [Dry Eyes] : no dryness of the eyes [Vision Problems] : no vision problems [Loss of Hearing] : no loss of hearing [Nosebleeds] : no nosebleeds [Hoarseness] : no hoarseness [Odynophagia] : no odynophagia [Mucosal Pain] : no mucosal pain [Chest Pain] : no chest pain [Palpitations] : no palpitations [Leg Claudication] : no intermittent leg claudication [Lower Ext Edema] : no lower extremity edema [Shortness Of Breath] : no shortness of breath [Wheezing] : no wheezing [Cough] : no cough [Abdominal Pain] : no abdominal pain [Vomiting] : no vomiting [Diarrhea] : no diarrhea [Dysuria] : no dysuria [Incontinence] : no incontinence [Joint Pain] : no joint pain [Joint Stiffness] : no joint stiffness [Muscle Pain] : no muscle pain [Skin Wound] : no skin wound [Confused] : no confusion [Dizziness] : no dizziness [Fainting] : no fainting [Difficulty Walking] : no difficulty walking [Suicidal] : not suicidal [Insomnia] : no insomnia [Anxiety] : no anxiety [Depression] : no depression [Proptosis] : no proptosis [Hot Flashes] : no hot flashes [Muscle Weakness] : no muscle weakness [Deepening Of The Voice] : no deepening of the voice [Easy Bleeding] : no tendency for easy bleeding [Easy Bruising] : no tendency for easy bruising [Swollen Glands] : no swollen glands

## 2020-10-07 NOTE — ASSESSMENT
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 Patient has now completed 7 cycles of carboplatin AUC 4 + paclitaxel 150 mg/m2 + pembrolizumab 200 mg IV (last given on 6/30/2020). He was transitioned to single agent Keytruda (every 3 weeks as of 8/4/2020) and recently began radiation to the right cheek/buccal area. \par \par Patient agreed on the following: \par - continue immunotherapy (Keytruda every 3 weeks) as directed. \par - Use topical steroid cream to address his drug induced itchiness/skin rash. \par - Remain on levothyroxine 100 mg daily as directed to address hypothyroid dysfunction.\par - Use Biotene mouth wash as needed for dry mouth. \par - Return to the office in 2 weeks for re-evaluation after radiation is completed. (10/20 at 10:30 am with Dr. Alexis).

## 2020-10-07 NOTE — HISTORY OF PRESENT ILLNESS
[Disease: _____________________] : Disease: [unfilled] [T: ___] : T[unfilled] [N: ___] : N[unfilled] [M: ___] : M[unfilled] [AJCC Stage: ____] : AJCC Stage: [unfilled] [Treatment Protocol] : Treatment Protocol [de-identified] : 80 year old male presenting to [facility] for oncologic care. He was formerly under the care of Dr. Cassia Hayes (medical oncology). Patient noted the development of a "bump" in his left cheek in the beginning of 2019. He also admitted to oral cavity pain and bleeding but denied dysphagia, hoarseness or history of trauma. As it started to grow, he sought medical help and had further work up done. Patient saw Dr. 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 \par \par Patient was evaluated by Dr. Selvin Christianson (surgery) on 6/5/2019 and ordered imaging studies. A PET/CT scan on 6/17/2019 revealed the following: Hypermetabolic enhancing left upper gingival mass corresponds to patient's known squamous cell carcinoma and hypermetabolic bilateral level IB and IIA cervical lymph nodes compatible with metastatic disease. A CT neck scan done on the same day showed a large plaque like enhancement abnormality centered along the left maxillary gingivobuccal sulcus extending into the left maxillary sinus and toward the left infraorbital canal, and left greater palatine foramen.\par \par Patient was referred to Dr. Ho Roca (ENT) on 7/1/2019 and ordered a MRI head scan the next day, which showed extensive disease extending into the ITF, possible concern along the NPx and OPx and concerning LAD bilaterally.  However, FNA of the right neck nodes are all reported negative (FNA cervical node right 6/25/19 - negative for malignancy on level 1B, 2 and 3). The consensus was for induction chemotherapy + immunotherapy to assess for response of disease followed by surgery and/or adjuvant CXRT versus concurrent CXRT versus systemic palliation based on response.\par \par Patient was treated by Dr. Campbell for systemic therapy (s/p 3 cycles, Carboplatin/Paclitaxel + Keytruda every 3 weeks from 7/23/2019 - 9/4/201) and Dr. Josy Garibay (radiation oncology) for radiation therapy 70Gy (finished in December 2019) without any major issues. Follow-up MRI on 9/8/2019 demonstrated an almost complete response of disease. Shortly after completing treatment, he flew down to his winter home in [place] in December 2019. \par \par While in [place], patient felt a bump in his mouth in March 2020 and subsequently underwent a biopsy on the R side of the mouth by Dr. Kartik Graves on 3/2/2020 and results confirmed SCCa. He also completed had a PET scan; results demonstrated 3.9 cm X 2.8 cm FDG avid mass (SUV 8.0) left side of oral cavity; no description of lymph node or metastatic disease. Patient was advised to return to [place] for further management.  [de-identified] : SCC [de-identified] : 3/2/2020: right buccal biopsy: SCCa (recurrent disease); performed by Dr. Kartik Graves [FreeTextEntry1] : s/p 7 cycles Carboplatin + Paclitaxel + Keytruda [de-identified] : Patient was scheduled to receive 5 additional RT treatments (began 9/29/2020, to be completed on 10/13/2020). He noted mild decrease on the right oral cavity lesion and pus-like discharge from his right cheek (currently covered with a band-aid). His difficulty swallowing to solid foods remains unchanged. Patient also complained of constipation (currently on stool softeners and prune juice).

## 2020-10-07 NOTE — PHYSICAL EXAM
[Ambulatory and capable of all self care but unable to carry out any work activities] : Status 2- Ambulatory and capable of all self care but unable to carry out any work activities. Up and about more than 50% of waking hours [Obese] : obese [Normal] : affect appropriate [Ulcers] : no ulcers [Mucositis] : no mucositis [Thrush] : no thrush [Vesicles] : no vesicles [de-identified] : right sided buccal tumor present [de-identified] : bilateral cervical lymphadenopathy [de-identified] : ventral hernia, liver edge palpable [de-identified] : excoriation noted on bilateral forearm, ecchymosis noted on right forearm

## 2020-10-08 NOTE — REVIEW OF SYSTEMS
[Dysphagia: Grade 0] : Dysphagia: Grade 0 [Tinnitus - Grade 0] : Tinnitus - Grade 0 [Blurred Vision: Grade 0] : Blurred Vision: Grade 0 [Mucositis Oral: Grade 0] : Mucositis Oral: Grade 0  [Xerostomia: Grade 1 - Symptomatic (e.g., dry or thick saliva) without significant dietary alteration; unstimulated saliva flow >0.2 ml/min] : Xerostomia: Grade 1 - Symptomatic (e.g., dry or thick saliva) without significant dietary alteration; unstimulated saliva flow >0.2 ml/min [Oral Pain: Grade 0] : Oral Pain: Grade 0 [Salivary duct inflammation: Grade 0] : Salivary duct inflammation: Grade 0 [Dysgeusia: Grade 1- Altered taste but no change in diet] : Dysgeusia: Grade 1 - Altered taste but no change in diet [Hoarseness: Grade 0] : Hoarseness: Grade 0 [Alopecia: Grade 0] : Alopecia: Grade 0 [Pruritus: Grade 0] : Pruritus: Grade 0 [Skin Atrophy: Grade 0] : Skin Atrophy: Grade 0 [Skin Hyperpigmentation: Grade 1 - Hyperpigmentation covering <10% BSA; no psychosocial impact] : Skin Hyperpigmentation: Grade 1 - Hyperpigmentation covering <10% BSA; no psychosocial impact [Skin Induration: Grade 0] : Skin Induration: Grade 0 [Dermatitis Radiation: Grade 1 - Faint erythema or dry desquamation] : Dermatitis Radiation: Grade 1 - Faint erythema or dry desquamation

## 2020-10-15 PROCEDURE — 77435 SBRT MANAGEMENT: CPT

## 2020-10-20 ENCOUNTER — APPOINTMENT (OUTPATIENT)
Dept: HEMATOLOGY ONCOLOGY | Facility: CLINIC | Age: 80
End: 2020-10-20
Payer: MEDICARE

## 2020-10-20 VITALS
HEART RATE: 75 BPM | OXYGEN SATURATION: 100 % | WEIGHT: 160.92 LBS | DIASTOLIC BLOOD PRESSURE: 87 MMHG | HEIGHT: 65 IN | BODY MASS INDEX: 26.81 KG/M2 | SYSTOLIC BLOOD PRESSURE: 143 MMHG | RESPIRATION RATE: 14 BRPM | TEMPERATURE: 98.6 F

## 2020-10-20 PROCEDURE — 99215 OFFICE O/P EST HI 40 MIN: CPT

## 2020-10-21 NOTE — DISEASE MANAGEMENT
[Clinical] : TNM Stage: c [ROSE] : ROSE [TTNM] : 4a [NTNM] : 2b [MTNM] : 0 [de-identified] : 30 Gy [de-identified] : right neck / cheek

## 2020-10-21 NOTE — HISTORY OF PRESENT ILLNESS
[FreeTextEntry1] : Mr. 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 Alexis.\par \par He has now undergone completed 7 cycles of carboplatin AUC 4 + paclitaxel 150 mg/m2 + pembrolizumab 200 mg IV (last given on 6/30/2020). He last saw Dr. Alexis 7/30/20 who has been continuing keytruda, scheduled through 10/23. \par \par 3/5 fractions of RT today. No changes from preRT baseline. Still on chemo\par \par

## 2020-10-22 NOTE — ASSESSMENT
[Palliative] : Goals of care discussed with patient: Palliative [Palliative Care Plan] : Patient was apprised of incurable nature of disease.  Hospice and Palliative care options discussed. [FreeTextEntry1] : G Bromberg is a 80 year old; he has plans to go to  [place], [place] He requests records to be sent to his physician in [place]. The patient is scheduled for pembrolizumab Oct 27 and Nov 17 2020.\par I spoke with his wife Kathy by telephone; Mr Pollard and I provided documentation asking to cancel his proposed cruise; he is currently not medically fit to travel.\par Topher Butler MD telephone number . Patietn treatment information will be sent to Dr Butler who may continue palliative care in [place]. I Have told the patient's wife that the tumor is smaller but still present and that although two additional immune therapies are planned to be given here, disease may eventually recur.

## 2020-10-22 NOTE — PHYSICAL EXAM
[Ambulatory and capable of all self care but unable to carry out any work activities] : Status 2- Ambulatory and capable of all self care but unable to carry out any work activities. Up and about more than 50% of waking hours [Ulcers] : ulcers [Mucositis] : mucositis [Normal] : affect appropriate [Thrush] : no thrush [Vesicles] : no vesicles [de-identified] : enlargement of the left side of the face

## 2020-10-22 NOTE — HISTORY OF PRESENT ILLNESS
[Disease: _____________________] : Disease: [unfilled] [T: ___] : T[unfilled] [N: ___] : N[unfilled] [M: ___] : M[unfilled] [AJCC Stage: ____] : AJCC Stage: [unfilled] [Treatment Protocol] : Treatment Protocol [Date: ____________] : Patient's last distress assessment performed on [unfilled]. [de-identified] : Mr. GORDON BROMBERG is a pleasant 80 year old man who comes here today after having finished radiation therapy. 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 As the bump started to grow, he sought medical help and had the work up done below. \par \par PET-CT 6/17/19\par 1. Hypermetabolic enhancing left upper gingival mass corresponds to patient's known squamous cell carcinoma. Please see report of contrast-enhanced CT of same day for further description of the primary lesion. \par 2. Nonspecific, asymmetric hypermetabolism in left nasopharynx and left greater than right base of tongue. Please correlate with direct visualization. \par 3. Hypermetabolic bilateral level IB and IIA cervical lymph nodes are compatible with metastatic disease. Ultrasound-guided percutaneous needle biopsy may be obtained for confirmation. \par 4. Non FDG-avid right upper lobe nodular opacity is nonspecific. Correlate clinically for infection. A dedicated CT of chest is recommended in one month for further evaluation. \par 5. Markedly enlarged prostate gland without associated hypermetabolism.\par \par CT neck 6/17/19\par Large plaque like enhancement abnormality centered along the left maxillary gingivobuccal sulcus extending into the left maxillary sinus and toward the left infraorbital canal, and left greater palatine foramen as described. Contrast-enhanced MR imaging of the skull base can be done to evaluate for possible perineural spread of disease. \par \par Contiguous versus possible synchronous additional malignant disease seen along the left nasopharynx, extending to the uvula. Left asymmetric lingual tonsillar enhancement extending to the vallecula which may represent tonsillar hypertrophy though additional neoplastic disease is not excluded. \par \par Bilateral pathologic level I, left II, bilateral III lymphadenopathy. \par \par MRI brain 7/2/19\par A large bulky mass with associated bony destruction is again noted involving the left maxillary alveolus, floor of the left maxillary sinus, and lower aspect of the posterior lateral wall of the left maxillary sinus, with extension to the left buccal space across the gingival buccal sulcus. The tumor extends into the lower retromaxillary fat. Tumor abuts and may involve the base of the pterygoid plates and greater-lesser palatine foramina, however the pterygopalatine fossa, infraorbital canal, Meckel's caves, and cavernous sinuses appear grossly spared. A component of the tumor may spread submucosally underneath the left hard palate. \par \par Nonspecific asymmetric enhancement involving the left nasopharynx, left soft palate, uvula, and left tonsil appears stable compared to the prior CT. Correlate with direct visualization findings to exclude mucosal or submucosal spread of tumor versus lymphoid hypertrophy. \par \par Heterogeneous bilateral level 1 and level 2 lymph nodes are again noted suspicious for pathologic lymphadenopathy, stable in size compared to the CT examination. 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 \par \par past medical history reviewed no history of a heart attack [de-identified] : SCC [FreeTextEntry1] : retreatment carboplatin 1%/; 9%; 90% of dosing AUC 4; paclitaxel 150 mg/m2; pembrolizumab 200 mg now cycle 8 in October 2020 [de-identified] : He is feeling well. no head ache and no mouth bleeding. He still feels irregularity in the right buccal are. He notes some sloughing of tissue since or prior to the radiation treatment \par he had five treatments; the last treatment was last Thursday 10/15/2020. He is able to eat.\par  No fever no cough and  no symptoms consistent with COVID 19 infection. He is practicing social distancing and wearing a mask to mitigate.

## 2020-10-27 ENCOUNTER — LABORATORY RESULT (OUTPATIENT)
Age: 80
End: 2020-10-27

## 2020-10-27 ENCOUNTER — APPOINTMENT (OUTPATIENT)
Dept: INFUSION THERAPY | Facility: HOSPITAL | Age: 80
End: 2020-10-27

## 2020-10-27 ENCOUNTER — RESULT REVIEW (OUTPATIENT)
Age: 80
End: 2020-10-27

## 2020-10-27 LAB
BASOPHILS # BLD AUTO: 0.01 K/UL — SIGNIFICANT CHANGE UP (ref 0–0.2)
BASOPHILS NFR BLD AUTO: 0.1 % — SIGNIFICANT CHANGE UP (ref 0–2)
EOSINOPHIL # BLD AUTO: 0.01 K/UL — SIGNIFICANT CHANGE UP (ref 0–0.5)
EOSINOPHIL NFR BLD AUTO: 0.1 % — SIGNIFICANT CHANGE UP (ref 0–6)
HCT VFR BLD CALC: 33.7 % — LOW (ref 39–50)
HGB BLD-MCNC: 11 G/DL — LOW (ref 13–17)
IMM GRANULOCYTES NFR BLD AUTO: 1 % — SIGNIFICANT CHANGE UP (ref 0–1.5)
LYMPHOCYTES # BLD AUTO: 0.46 K/UL — LOW (ref 1–3.3)
LYMPHOCYTES # BLD AUTO: 4.5 % — LOW (ref 13–44)
MCHC RBC-ENTMCNC: 30.2 PG — SIGNIFICANT CHANGE UP (ref 27–34)
MCHC RBC-ENTMCNC: 32.6 G/DL — SIGNIFICANT CHANGE UP (ref 32–36)
MCV RBC AUTO: 92.6 FL — SIGNIFICANT CHANGE UP (ref 80–100)
MONOCYTES # BLD AUTO: 0.7 K/UL — SIGNIFICANT CHANGE UP (ref 0–0.9)
MONOCYTES NFR BLD AUTO: 6.8 % — SIGNIFICANT CHANGE UP (ref 2–14)
NEUTROPHILS # BLD AUTO: 8.95 K/UL — HIGH (ref 1.8–7.4)
NEUTROPHILS NFR BLD AUTO: 87.5 % — HIGH (ref 43–77)
NRBC # BLD: 0 /100 WBCS — SIGNIFICANT CHANGE UP (ref 0–0)
PLATELET # BLD AUTO: 353 K/UL — SIGNIFICANT CHANGE UP (ref 150–400)
RBC # BLD: 3.64 M/UL — LOW (ref 4.2–5.8)
RBC # FLD: 14.1 % — SIGNIFICANT CHANGE UP (ref 10.3–14.5)
WBC # BLD: 10.23 K/UL — SIGNIFICANT CHANGE UP (ref 3.8–10.5)
WBC # FLD AUTO: 10.23 K/UL — SIGNIFICANT CHANGE UP (ref 3.8–10.5)

## 2020-11-06 ENCOUNTER — APPOINTMENT (OUTPATIENT)
Dept: HEMATOLOGY ONCOLOGY | Facility: CLINIC | Age: 80
End: 2020-11-06
Payer: MEDICARE

## 2020-11-06 ENCOUNTER — NON-APPOINTMENT (OUTPATIENT)
Age: 80
End: 2020-11-06

## 2020-11-06 PROCEDURE — 99441: CPT | Mod: 95

## 2020-11-10 ENCOUNTER — OUTPATIENT (OUTPATIENT)
Dept: OUTPATIENT SERVICES | Facility: HOSPITAL | Age: 80
LOS: 1 days | Discharge: HOME | End: 2020-11-10

## 2020-11-10 DIAGNOSIS — Z92.3 PERSONAL HISTORY OF IRRADIATION: Chronic | ICD-10-CM

## 2020-11-10 DIAGNOSIS — C06.9 MALIGNANT NEOPLASM OF MOUTH, UNSPECIFIED: ICD-10-CM

## 2020-11-10 DIAGNOSIS — Z92.21 PERSONAL HISTORY OF ANTINEOPLASTIC CHEMOTHERAPY: Chronic | ICD-10-CM

## 2020-11-10 DIAGNOSIS — C76.0 MALIGNANT NEOPLASM OF HEAD, FACE AND NECK: ICD-10-CM

## 2020-11-17 ENCOUNTER — APPOINTMENT (OUTPATIENT)
Dept: INFUSION THERAPY | Facility: HOSPITAL | Age: 80
End: 2020-11-17

## 2020-11-17 ENCOUNTER — RESULT REVIEW (OUTPATIENT)
Age: 80
End: 2020-11-17

## 2020-11-17 ENCOUNTER — LABORATORY RESULT (OUTPATIENT)
Age: 80
End: 2020-11-17

## 2020-11-17 ENCOUNTER — APPOINTMENT (OUTPATIENT)
Dept: HEMATOLOGY ONCOLOGY | Facility: CLINIC | Age: 80
End: 2020-11-17
Payer: MEDICARE

## 2020-11-17 LAB
BASOPHILS # BLD AUTO: 0.02 K/UL — SIGNIFICANT CHANGE UP (ref 0–0.2)
BASOPHILS NFR BLD AUTO: 0.2 % — SIGNIFICANT CHANGE UP (ref 0–2)
EOSINOPHIL # BLD AUTO: 0.01 K/UL — SIGNIFICANT CHANGE UP (ref 0–0.5)
EOSINOPHIL NFR BLD AUTO: 0.1 % — SIGNIFICANT CHANGE UP (ref 0–6)
HCT VFR BLD CALC: 35.9 % — LOW (ref 39–50)
HGB BLD-MCNC: 11.6 G/DL — LOW (ref 13–17)
IMM GRANULOCYTES NFR BLD AUTO: 0.7 % — SIGNIFICANT CHANGE UP (ref 0–1.5)
LYMPHOCYTES # BLD AUTO: 0.64 K/UL — LOW (ref 1–3.3)
LYMPHOCYTES # BLD AUTO: 5.3 % — LOW (ref 13–44)
MCHC RBC-ENTMCNC: 29.8 PG — SIGNIFICANT CHANGE UP (ref 27–34)
MCHC RBC-ENTMCNC: 32.3 G/DL — SIGNIFICANT CHANGE UP (ref 32–36)
MCV RBC AUTO: 92.3 FL — SIGNIFICANT CHANGE UP (ref 80–100)
MONOCYTES # BLD AUTO: 1.11 K/UL — HIGH (ref 0–0.9)
MONOCYTES NFR BLD AUTO: 9.1 % — SIGNIFICANT CHANGE UP (ref 2–14)
NEUTROPHILS # BLD AUTO: 10.29 K/UL — HIGH (ref 1.8–7.4)
NEUTROPHILS NFR BLD AUTO: 84.6 % — HIGH (ref 43–77)
NRBC # BLD: 0 /100 WBCS — SIGNIFICANT CHANGE UP (ref 0–0)
PLATELET # BLD AUTO: 409 K/UL — HIGH (ref 150–400)
RBC # BLD: 3.89 M/UL — LOW (ref 4.2–5.8)
RBC # FLD: 14.6 % — HIGH (ref 10.3–14.5)
WBC # BLD: 12.16 K/UL — HIGH (ref 3.8–10.5)
WBC # FLD AUTO: 12.16 K/UL — HIGH (ref 3.8–10.5)

## 2020-11-17 PROCEDURE — 99214 OFFICE O/P EST MOD 30 MIN: CPT

## 2020-11-17 NOTE — ASSESSMENT
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 Patient has now completed 7 cycles of carboplatin AUC 4 + paclitaxel 150 mg/m2 + pembrolizumab 200 mg IV (last given on 6/30/2020). He was transitioned to single agent Keytruda (every 3 weeks as of 8/4/2020) and completed radiation to the right cheek/buccal area (5 fractions of RT) as of 10/13/2020. Patient completed 13 cycles of Keytruda as of today.  \par \par He agreed on the following: \par - Follow-up imaging study requested to assess for treatment response on 12/1/2020. \par - Bactroban ointment prescribed to apply to open wound of right cheek region. \par - Use topical steroid cream to address his drug induced itchiness/skin rash. \par - Remain on levothyroxine 100 mg daily as directed to address hypothyroid dysfunction.\par - Use Biotene mouth wash or solution combination of warm water + baking soda + salt for oral cavity hygiene. \par - Follow up with radiation and ENT as directed. \par - Wound care evaluation requested via home care. \par - Plan to discuss imaging study results once made available. Discussed with Dr. Pranay Alexis.

## 2020-11-17 NOTE — HISTORY OF PRESENT ILLNESS
[Disease: _____________________] : Disease: [unfilled] [T: ___] : T[unfilled] [N: ___] : N[unfilled] [M: ___] : M[unfilled] [AJCC Stage: ____] : AJCC Stage: [unfilled] [Treatment Protocol] : Treatment Protocol [Therapy: ___] : Therapy: [unfilled] [Cycle: ___] : Cycle: [unfilled] [Date: ____________] : Patient's last distress assessment performed on [unfilled]. [de-identified] : 80 year old male presenting to [facility] for oncologic care. He was formerly under the care of Dr. Cassia Hayes (medical oncology). Patient noted the development of a "bump" in his left cheek in the beginning of 2019. He also admitted to oral cavity pain and bleeding but denied dysphagia, hoarseness or history of trauma. As it started to grow, he sought medical help and had further work up done. Patient saw Dr. 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 \par \par Patient was evaluated by Dr. Selvin Christianson (surgery) on 6/5/2019 and ordered imaging studies. A PET/CT scan on 6/17/2019 revealed the following: Hypermetabolic enhancing left upper gingival mass corresponds to patient's known squamous cell carcinoma and hypermetabolic bilateral level IB and IIA cervical lymph nodes compatible with metastatic disease. A CT neck scan done on the same day showed a large plaque like enhancement abnormality centered along the left maxillary gingivobuccal sulcus extending into the left maxillary sinus and toward the left infraorbital canal, and left greater palatine foramen.\par \par Patient was referred to Dr. Ho Roca (ENT) on 7/1/2019 and ordered a MRI head scan the next day, which showed extensive disease extending into the ITF, possible concern along the NPx and OPx and concerning LAD bilaterally.  However, FNA of the right neck nodes are all reported negative (FNA cervical node right 6/25/19 - negative for malignancy on level 1B, 2 and 3). The consensus was for induction chemotherapy + immunotherapy to assess for response of disease followed by surgery and/or adjuvant CXRT versus concurrent CXRT versus systemic palliation based on response.\par \par Patient was treated by Dr. Campbell for systemic therapy (s/p 3 cycles, Carboplatin/Paclitaxel + Keytruda every 3 weeks from 7/23/2019 - 9/4/201) and Dr. Josy Garibay (radiation oncology) for radiation therapy 70Gy (finished in December 2019) without any major issues. Follow-up MRI on 9/8/2019 demonstrated an almost complete response of disease. Shortly after completing treatment, he flew down to his winter home in [place] in December 2019. \par \par While in [place], patient felt a bump in his mouth in March 2020 and subsequently underwent a biopsy on the R side of the mouth by Dr. Kartik Graves on 3/2/2020 and results confirmed SCCa. He also completed had a PET scan; results demonstrated 3.9 cm X 2.8 cm FDG avid mass (SUV 8.0) left side of oral cavity; no description of lymph node or metastatic disease. Patient was advised to return to [place] for further management.  [de-identified] : SCC [de-identified] : 3/2/2020: right buccal biopsy: SCCa (recurrent disease); performed by Dr. Kartik Graves [FreeTextEntry1] : s/p 7 cycles Carboplatin + Paclitaxel + Keytruda, currently on single agent Keytruda  [de-identified] : Patient presented to [facility] for his scheduled follow-up visit and treatment appointment. He noted development of a pinpoint sized hole of his right cheek last month. He admitted to mild leakage from that site, specifically after he eats or drinks fluids; intermittent discharge (present since he received radiation last month) has remained persistent. He denied pain, tenderness at site, bleeding, fever/chills, recent trauma/injury, new lesions at this time.

## 2020-11-17 NOTE — PHYSICAL EXAM
[Ambulatory and capable of all self care but unable to carry out any work activities] : Status 2- Ambulatory and capable of all self care but unable to carry out any work activities. Up and about more than 50% of waking hours [Obese] : obese [Normal] : affect appropriate [Ulcers] : no ulcers [Mucositis] : no mucositis [Thrush] : no thrush [Vesicles] : no vesicles [de-identified] : pinpoint sized hole of his right cheek, currently covered in bandage, drainage present [de-identified] : bilateral cervical lymphadenopathy [de-identified] : ventral hernia, liver edge palpable [de-identified] : excoriation noted on bilateral forearm, ecchymosis noted on right forearm

## 2020-11-18 ENCOUNTER — NON-APPOINTMENT (OUTPATIENT)
Age: 80
End: 2020-11-18

## 2020-11-18 DIAGNOSIS — R11.2 NAUSEA WITH VOMITING, UNSPECIFIED: ICD-10-CM

## 2020-11-18 DIAGNOSIS — Z51.11 ENCOUNTER FOR ANTINEOPLASTIC CHEMOTHERAPY: ICD-10-CM

## 2020-11-19 ENCOUNTER — APPOINTMENT (OUTPATIENT)
Dept: RADIATION ONCOLOGY | Facility: CLINIC | Age: 80
End: 2020-11-19
Payer: MEDICARE

## 2020-11-19 VITALS
RESPIRATION RATE: 15 BRPM | WEIGHT: 158.73 LBS | BODY MASS INDEX: 26.41 KG/M2 | OXYGEN SATURATION: 98 % | TEMPERATURE: 97.3 F | DIASTOLIC BLOOD PRESSURE: 80 MMHG | HEART RATE: 73 BPM | SYSTOLIC BLOOD PRESSURE: 151 MMHG

## 2020-11-19 PROCEDURE — 99024 POSTOP FOLLOW-UP VISIT: CPT | Mod: GC

## 2020-11-20 ENCOUNTER — APPOINTMENT (OUTPATIENT)
Dept: OTOLARYNGOLOGY | Facility: CLINIC | Age: 80
End: 2020-11-20
Payer: MEDICARE

## 2020-11-20 VITALS
SYSTOLIC BLOOD PRESSURE: 127 MMHG | HEIGHT: 65 IN | WEIGHT: 158 LBS | HEART RATE: 76 BPM | DIASTOLIC BLOOD PRESSURE: 82 MMHG | BODY MASS INDEX: 26.33 KG/M2

## 2020-11-20 PROCEDURE — 99214 OFFICE O/P EST MOD 30 MIN: CPT | Mod: 25

## 2020-11-20 PROCEDURE — 31575 DIAGNOSTIC LARYNGOSCOPY: CPT

## 2020-11-20 RX ORDER — PREDNISONE 20 MG/1
20 TABLET ORAL DAILY
Qty: 30 | Refills: 0 | Status: COMPLETED | COMMUNITY
Start: 2020-04-17 | End: 2020-11-20

## 2020-11-20 RX ORDER — PREDNISONE 10 MG/1
10 TABLET ORAL
Qty: 15 | Refills: 0 | Status: COMPLETED | COMMUNITY
Start: 2020-05-19 | End: 2020-11-20

## 2020-11-20 RX ORDER — METOCLOPRAMIDE 10 MG/1
10 TABLET ORAL 3 TIMES DAILY
Qty: 30 | Refills: 2 | Status: COMPLETED | COMMUNITY
Start: 2020-04-17 | End: 2020-11-20

## 2020-11-20 NOTE — HISTORY OF PRESENT ILLNESS
[de-identified] : HX of L Maxillary SCCa and more recently R buccal SCCa dx in March 2020.  Pt just finished Chemo two days ago and he finished 5 radiation treatments three weeks ago.  Pt now has a R  cheek wound which has eroded through the cheek.  PT is eating on his L side.

## 2020-11-20 NOTE — PROCEDURE
[Trismus] : trismus preventing mirror examination [None] : none [Flexible Endoscope] : examined with the flexible endoscope [Serial Number: ___] : Serial Number: [unfilled] [de-identified] : No lesions in the NPx, OPx, HPx or larynx. Stable posttreatment changes, VC are mobile, airway patent.\par

## 2020-11-20 NOTE — PHYSICAL EXAM
[Laryngoscopy Performed] : laryngoscopy was performed, see procedure section for findings [Normal] : no rashes [de-identified] : Posttreatment changes, no LAD. [FreeTextEntry1] : Significant improvement in the primary disease with great decrease in size of the lesion but possible residual disease along the gingivolabial sulcus on the left maxilla vs fibrosis from treatment.  There is significant progression of the right buccal lesion with erosion through the overlying skin now and significant lymphedema along the right hemiface which is worse than the last visit. [de-identified] : No other LAD.

## 2020-11-23 ENCOUNTER — RESULT REVIEW (OUTPATIENT)
Age: 80
End: 2020-11-23

## 2020-11-23 ENCOUNTER — APPOINTMENT (OUTPATIENT)
Dept: NUCLEAR MEDICINE | Facility: CLINIC | Age: 80
End: 2020-11-23

## 2020-11-23 ENCOUNTER — APPOINTMENT (OUTPATIENT)
Dept: MRI IMAGING | Facility: CLINIC | Age: 80
End: 2020-11-23
Payer: MEDICARE

## 2020-11-23 ENCOUNTER — APPOINTMENT (OUTPATIENT)
Dept: NUCLEAR MEDICINE | Facility: CLINIC | Age: 80
End: 2020-11-23
Payer: MEDICARE

## 2020-11-23 ENCOUNTER — OUTPATIENT (OUTPATIENT)
Dept: OUTPATIENT SERVICES | Facility: HOSPITAL | Age: 80
LOS: 1 days | End: 2020-11-23
Payer: MEDICARE

## 2020-11-23 DIAGNOSIS — Z92.21 PERSONAL HISTORY OF ANTINEOPLASTIC CHEMOTHERAPY: Chronic | ICD-10-CM

## 2020-11-23 DIAGNOSIS — Z92.3 PERSONAL HISTORY OF IRRADIATION: Chronic | ICD-10-CM

## 2020-11-23 DIAGNOSIS — Z00.8 ENCOUNTER FOR OTHER GENERAL EXAMINATION: ICD-10-CM

## 2020-11-23 PROCEDURE — A9552: CPT

## 2020-11-23 PROCEDURE — 78815 PET IMAGE W/CT SKULL-THIGH: CPT | Mod: 26,PS

## 2020-11-23 PROCEDURE — A9585: CPT

## 2020-11-23 PROCEDURE — 78815 PET IMAGE W/CT SKULL-THIGH: CPT

## 2020-11-23 PROCEDURE — 70543 MRI ORBT/FAC/NCK W/O &W/DYE: CPT | Mod: 26

## 2020-11-23 PROCEDURE — 70543 MRI ORBT/FAC/NCK W/O &W/DYE: CPT

## 2020-11-23 NOTE — HISTORY OF PRESENT ILLNESS
[FreeTextEntry1] : Mr. 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 Alexis.\par \par He completed 30 Gy / 5 fractions of radiation to right cheek in 10/2020. h/o chemo under the care of Dr Alexis. c/o dysphagia, wound on right cheek.\par \par Seeing Dr. Roca tomorrow. No pain, however has fungating mass with perforation on R cheek c/w LR. Weight loss continues and needs wound care. Liquids spilling out through cheek.

## 2020-11-23 NOTE — PHYSICAL EXAM
[General Appearance - Well Developed] : well developed [Sclera] : the sclera and conjunctiva were normal [de-identified] : Fungating mass on R cheek, 4cm, with perfoation into mouth

## 2020-11-23 NOTE — END OF VISIT
[] : Resident [FreeTextEntry3] : Recommended palliative care or systemic options if available.  [Time Spent: ___ minutes] : I have spent [unfilled] minutes of time on the encounter. [>50% of the face to face encounter time was spent on counseling and/or coordination of care for ___] : Greater than 50% of the face to face encounter time was spent on counseling and/or coordination of care for [unfilled]

## 2020-11-23 NOTE — REVIEW OF SYSTEMS
[Dysphagia] : dysphagia [Negative] : Allergic/Immunologic [Dysphagia: Grade 1 - Symptomatic, able to eat regular diet] : Dysphagia: Grade 1 - Symptomatic, able to eat regular diet [Tinnitus - Grade 0] : Tinnitus - Grade 0 [Blurred Vision: Grade 0] : Blurred Vision: Grade 0 [Mucositis Oral: Grade 1 - Asymptomatic or mild symptoms; intervention not indicated] : Mucositis Oral: Grade 1 - Asymptomatic or mild symptoms; intervention not indicated [Xerostomia: Grade 1 - Symptomatic (e.g., dry or thick saliva) without significant dietary alteration; unstimulated saliva flow >0.2 ml/min] : Xerostomia: Grade 1 - Symptomatic (e.g., dry or thick saliva) without significant dietary alteration; unstimulated saliva flow >0.2 ml/min [Oral Pain: Grade 1 - Mild pain] : Oral Pain: Grade 1 - Mild pain [Salivary duct inflammation: Grade 0] : Salivary duct inflammation: Grade 0 [Dysgeusia: Grade 0] : Dysgeusia: Grade 0 [Cough: Grade 0] : Cough: Grade 0 [Dyspnea: Grade 0] : Dyspnea: Grade 0 [Hoarseness: Grade 0] : Hoarseness: Grade 0 [Alopecia: Grade 0] : Alopecia: Grade 0 [Pruritus: Grade 0] : Pruritus: Grade 0 [Skin Atrophy: Grade 0] : Skin Atrophy: Grade 0 [Skin Hyperpigmentation: Grade 1 - Hyperpigmentation covering <10% BSA; no psychosocial impact] : Skin Hyperpigmentation: Grade 1 - Hyperpigmentation covering <10% BSA; no psychosocial impact [Skin Induration: Grade 0] : Skin Induration: Grade 0 [Dermatitis Radiation: Grade 2 - Moderate to brisk erythema; patchy moist desquamation, mostly confined to skin folds and creases; moderate edema] : Dermatitis Radiation: Grade 2 - Moderate to brisk erythema; patchy moist desquamation, mostly confined to skin folds and creases; moderate edema [FreeTextEntry4] : h/o radiation in 2019, and 10/2020 [FreeTextEntry6] : right cheek wound

## 2020-11-30 ENCOUNTER — APPOINTMENT (OUTPATIENT)
Dept: WOUND CARE | Facility: CLINIC | Age: 80
End: 2020-11-30
Payer: MEDICARE

## 2020-11-30 VITALS — SYSTOLIC BLOOD PRESSURE: 127 MMHG | HEART RATE: 73 BPM | DIASTOLIC BLOOD PRESSURE: 73 MMHG | TEMPERATURE: 97.2 F

## 2020-11-30 DIAGNOSIS — T66.XXXA RADIATION SICKNESS, UNSPECIFIED, INITIAL ENCOUNTER: ICD-10-CM

## 2020-11-30 PROCEDURE — 99204 OFFICE O/P NEW MOD 45 MIN: CPT

## 2020-11-30 NOTE — PHYSICAL EXAM
[JVD] : no jugular venous distention  [Normal Breath Sounds] : Normal breath sounds [Abdomen Tenderness] : ~T ~M No abdominal tenderness [Skin Ulcer] : ulcer [Alert] : alert [Oriented to Person] : oriented to person [Oriented to Place] : oriented to place [Oriented to Time] : oriented to time [Calm] : calm [de-identified] : NAD, ambulatory [de-identified] : AT [de-identified] : supple [de-identified] : soft [Please See PDF for Tissue Analytics] : Please See PDF for [employer].

## 2020-11-30 NOTE — HISTORY OF PRESENT ILLNESS
[FreeTextEntry1] : Mr. 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 Alexis.  He states that post radiation treatment, he developed a large wound of his cheek than perforated into his oral cavity.\par He completed 30 Gy / 5 fractions of radiation to right cheek in 10/2020. h/o chemo.  No surgical intervention.  \par c/o dysphagia, nonhealing wound on right cheek.  It is difficult for him to eat with this wound.  He has received his MRI and PET scan.  He is awaiting his results and his case is being presented to tumor board.  \par \par

## 2020-11-30 NOTE — ASSESSMENT
[FreeTextEntry1] : Wound Assessment and Plan:\par \par The patient presents with a wound to the right cheek-.  Swelling noted to the head and neck.\par No clinical sign of infection\par Recommendation:\par \par Apply lidocaine or topical anesthetic if needed to reduce pain upon washing the wound.\par Wash wound with ----VASHE\par Apply ----carbon alginate\par Apply ----foam adhesive border\par Change dressing ---daily\par Leg elevation as tolerated\par Encouraged ambulation or exercise.\par Optimization of nutrition.\par Offloading to the wound site.\par \par -----Wound supplies ordered via DME\par Patient given contact information to DME\par \par -----Wife present and will change dressings\par \par Follow up appointment scheduled for  1 week\par \par TeleHealth Services discussed with the patient and/or family.  Discharge instructions given including download of [facility] information regarding:\par 1)  [employer] [facility] to obtain medical records\par 2)  [employer] [facility] to conduct Face-to-Face TeleHealth visit\par 3)  [employer] for the Patient (patient takes a picture of their wound which is sent to the patient's chart for review)\par

## 2020-12-07 ENCOUNTER — APPOINTMENT (OUTPATIENT)
Dept: INFUSION THERAPY | Facility: HOSPITAL | Age: 80
End: 2020-12-07

## 2020-12-07 ENCOUNTER — LABORATORY RESULT (OUTPATIENT)
Age: 80
End: 2020-12-07

## 2020-12-08 ENCOUNTER — APPOINTMENT (OUTPATIENT)
Dept: HEMATOLOGY ONCOLOGY | Facility: CLINIC | Age: 80
End: 2020-12-08
Payer: MEDICARE

## 2020-12-08 ENCOUNTER — APPOINTMENT (OUTPATIENT)
Dept: INFUSION THERAPY | Facility: HOSPITAL | Age: 80
End: 2020-12-08

## 2020-12-08 VITALS
WEIGHT: 158.29 LBS | SYSTOLIC BLOOD PRESSURE: 135 MMHG | HEIGHT: 65 IN | OXYGEN SATURATION: 97 % | HEART RATE: 75 BPM | TEMPERATURE: 97.6 F | BODY MASS INDEX: 26.37 KG/M2 | RESPIRATION RATE: 16 BRPM | DIASTOLIC BLOOD PRESSURE: 73 MMHG

## 2020-12-08 DIAGNOSIS — T50.905A OTHER PRURITUS: ICD-10-CM

## 2020-12-08 DIAGNOSIS — L29.8 OTHER PRURITUS: ICD-10-CM

## 2020-12-08 PROCEDURE — 99214 OFFICE O/P EST MOD 30 MIN: CPT

## 2020-12-08 RX ORDER — MUPIROCIN 20 MG/G
2 OINTMENT TOPICAL TWICE DAILY
Qty: 1 | Refills: 2 | Status: COMPLETED | COMMUNITY
Start: 2020-11-17 | End: 2020-12-08

## 2020-12-08 RX ORDER — CLOBETASOL PROPIONATE 0.5 MG/G
0.05 CREAM TOPICAL
Qty: 1 | Refills: 1 | Status: COMPLETED | COMMUNITY
Start: 2020-05-19 | End: 2020-12-08

## 2020-12-08 NOTE — REVIEW OF SYSTEMS
[Fatigue] : fatigue [Recent Change In Weight] : ~T recent weight change [Dysphagia] : dysphagia [SOB on Exertion] : shortness of breath during exertion [Constipation] : constipation [Skin Rash] : skin rash [Negative] : Allergic/Immunologic [Fever] : no fever [Chills] : no chills [Night Sweats] : no night sweats [Eye Pain] : no eye pain [Red Eyes] : eyes not red [Dry Eyes] : no dryness of the eyes [Vision Problems] : no vision problems [Loss of Hearing] : no loss of hearing [Nosebleeds] : no nosebleeds [Hoarseness] : no hoarseness [Odynophagia] : no odynophagia [Mucosal Pain] : no mucosal pain [Chest Pain] : no chest pain [Palpitations] : no palpitations [Leg Claudication] : no intermittent leg claudication [Lower Ext Edema] : no lower extremity edema [Shortness Of Breath] : no shortness of breath [Wheezing] : no wheezing [Cough] : no cough [Abdominal Pain] : no abdominal pain [Vomiting] : no vomiting [Diarrhea] : no diarrhea [Dysuria] : no dysuria [Incontinence] : no incontinence [Joint Pain] : no joint pain [Joint Stiffness] : no joint stiffness [Muscle Pain] : no muscle pain [Skin Wound] : skin wound [Confused] : no confusion [Dizziness] : no dizziness [Fainting] : no fainting [Difficulty Walking] : no difficulty walking [Suicidal] : not suicidal [Insomnia] : no insomnia [Anxiety] : no anxiety [Depression] : no depression [Proptosis] : no proptosis [Hot Flashes] : no hot flashes [Muscle Weakness] : no muscle weakness [Deepening Of The Voice] : no deepening of the voice [Easy Bleeding] : no tendency for easy bleeding [Easy Bruising] : no tendency for easy bruising [Swollen Glands] : no swollen glands

## 2020-12-08 NOTE — ASSESSMENT
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 Patient  completed 7 cycles of carboplatin AUC 4 + paclitaxel 150 mg/m2 + pembrolizumab 200 mg IV (last given on 6/30/2020). He was transitioned to single agent Keytruda (every 3 weeks as of 8/4/2020) and completed radiation to the right cheek/buccal area (5 fractions of RT) as of 10/13/2020. Patient completed 14 cycles of Keytruda as of today. His MRI face scan from 11/23/2020 revealed the following: continued progressive soft tissue enlargement enhancement right buccinator, , parotid, premaxillary soft tissues, right lateral oral commissure, right lateral upper and lower lips, left posterior maxillary alveolus with perineural tumor involvement and small although slightly heterogeneous lymph nodes, concerning for tumor progression.\par \par He agreed on the following: \par - Remain on Keytruda as directed at this time. Consideration of transitioning him to alternative agents such as weekly Fluorouracil or oral methotrexate but increased risk of side effects are also noted. Plan to discuss in further detail with Mrs. Bromberg via telephone.   \par - Remain on topical steroid cream as needed to treat his drug induced itchiness/skin rash. \par - Remain on levothyroxine 100 mg daily as directed to address hypothyroid dysfunction.\par - Use Biotene mouth wash or solution combination of warm water + baking soda + salt for oral cavity hygiene. \par - Follow up with wound care, radiation and ENT as directed. \par - Return to the office in 3 weeks. Seen and examined with Dr. Pranay Alexis.

## 2020-12-08 NOTE — PHYSICAL EXAM
[Ambulatory and capable of all self care but unable to carry out any work activities] : Status 2- Ambulatory and capable of all self care but unable to carry out any work activities. Up and about more than 50% of waking hours [Obese] : obese [Normal] : affect appropriate [Ulcers] : no ulcers [Mucositis] : no mucositis [Thrush] : no thrush [Vesicles] : no vesicles [de-identified] : pinpoint sized hole of his right cheek, currently covered in bandage, drainage present [de-identified] : bilateral cervical lymphadenopathy [de-identified] : ventral hernia, liver edge palpable [de-identified] : excoriation noted on bilateral forearm, ecchymosis noted on right forearm

## 2020-12-08 NOTE — HISTORY OF PRESENT ILLNESS
[Disease: _____________________] : Disease: [unfilled] [T: ___] : T[unfilled] [N: ___] : N[unfilled] [M: ___] : M[unfilled] [AJCC Stage: ____] : AJCC Stage: [unfilled] [Treatment Protocol] : Treatment Protocol [Therapy: ___] : Therapy: [unfilled] [Cycle: ___] : Cycle: [unfilled] [de-identified] : 80 year old male presenting to [facility] for oncologic care. He was formerly under the care of Dr. Cassia Hayes (medical oncology). Patient noted the development of a "bump" in his left cheek in the beginning of 2019. He also admitted to oral cavity pain and bleeding but denied dysphagia, hoarseness or history of trauma. As it started to grow, he sought medical help and had further work up done. Patient saw Dr. 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 \par \par Patient was evaluated by Dr. Selvin Christianson (surgery) on 6/5/2019 and ordered imaging studies. A PET/CT scan on 6/17/2019 revealed the following: Hypermetabolic enhancing left upper gingival mass corresponds to patient's known squamous cell carcinoma and hypermetabolic bilateral level IB and IIA cervical lymph nodes compatible with metastatic disease. A CT neck scan done on the same day showed a large plaque like enhancement abnormality centered along the left maxillary gingivobuccal sulcus extending into the left maxillary sinus and toward the left infraorbital canal, and left greater palatine foramen.\par \par Patient was referred to Dr. Ho Roca (ENT) on 7/1/2019 and ordered a MRI head scan the next day, which showed extensive disease extending into the ITF, possible concern along the NPx and OPx and concerning LAD bilaterally.  However, FNA of the right neck nodes are all reported negative (FNA cervical node right 6/25/19 - negative for malignancy on level 1B, 2 and 3). The consensus was for induction chemotherapy + immunotherapy to assess for response of disease followed by surgery and/or adjuvant CXRT versus concurrent CXRT versus systemic palliation based on response.\par \par Patient was treated by Dr. Campbell for systemic therapy (s/p 3 cycles, Carboplatin/Paclitaxel + Keytruda every 3 weeks from 7/23/2019 - 9/4/201) and Dr. Josy Garibay (radiation oncology) for radiation therapy 70Gy (finished in December 2019) without any major issues. Follow-up MRI on 9/8/2019 demonstrated an almost complete response of disease. Shortly after completing treatment, he flew down to his winter home in [place] in December 2019. \par \par While in [place], patient felt a bump in his mouth in March 2020 and subsequently underwent a biopsy on the R side of the mouth by Dr. Kartik Graves on 3/2/2020 and results confirmed SCCa. He also completed had a PET scan; results demonstrated 3.9 cm X 2.8 cm FDG avid mass (SUV 8.0) left side of oral cavity; no description of lymph node or metastatic disease. Patient was advised to return to [place] for further management.  [de-identified] : SCC [de-identified] : 3/2/2020: right buccal biopsy: SCCa (recurrent disease); performed by Dr. Kartik Graves [FreeTextEntry1] : s/p 7 cycles Carboplatin + Paclitaxel + Keytruda, currently on single agent Keytruda  [de-identified] : Patient presented to [facility] for his scheduled follow-up visit and treatment appointment. He was recently evaluated by wound care specialist. His right cheek/oral cavity lesion and dry mouth continues to remain persistent. He admitted to drainage from that site, currently covered by dressing bandage. He denied pain, tenderness at site, bleeding, fever/chills, recent trauma/injury, new lesions at this time.

## 2020-12-09 ENCOUNTER — NON-APPOINTMENT (OUTPATIENT)
Age: 80
End: 2020-12-09

## 2020-12-14 ENCOUNTER — NON-APPOINTMENT (OUTPATIENT)
Age: 80
End: 2020-12-14

## 2020-12-14 ENCOUNTER — APPOINTMENT (OUTPATIENT)
Dept: WOUND CARE | Facility: CLINIC | Age: 80
End: 2020-12-14
Payer: MEDICARE

## 2020-12-14 DIAGNOSIS — L59.8 OTHER SPECIFIED DISORDERS OF THE SKIN AND SUBCUTANEOUS TISSUE RELATED TO RADIATION: ICD-10-CM
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
## 2020-12-14 DIAGNOSIS — T66.XXXS RADIATION SICKNESS, UNSPECIFIED, SEQUELA: ICD-10-CM

## 2020-12-14 PROCEDURE — 99213 OFFICE O/P EST LOW 20 MIN: CPT | Mod: 95

## 2020-12-14 NOTE — HISTORY OF PRESENT ILLNESS
[Home] : at home, [unfilled] , at the time of the visit. [Medical Office: ([facility])___] : at the medical office located in  [Spouse] : spouse [FreeTextEntry3] : spouse [FreeTextEntry1] : Mr. 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 Alexis.  He states that post radiation treatment, he developed a large wound of his cheek than perforated into his oral cavity.\par He completed 30 Gy / 5 fractions of radiation to right cheek in 10/2020. h/o chemo.  No surgical intervention.  \par c/o dysphagia, nonhealing wound on right cheek.  It is difficult for him to eat with this wound.  He has received his MRI and PET scan.  He is awaiting his results and his case is being presented to tumor board.\par \par 12/14/20 chart reviewed \par Wife is reluctant to attempt telehealth as she reporte that she has not been successful in downloading [employer].\par She has an android phone\par She denies fever\par She reports that current wound care management is adequate \par Patient rinses mouth multiple times during day to assist in reducing wound odor\par She will f/u PRN\par all questions answered  \par \par

## 2020-12-15 ENCOUNTER — APPOINTMENT (OUTPATIENT)
Dept: OTOLARYNGOLOGY | Facility: CLINIC | Age: 80
End: 2020-12-15
Payer: MEDICARE

## 2020-12-15 VITALS
HEIGHT: 65 IN | HEART RATE: 76 BPM | BODY MASS INDEX: 26.33 KG/M2 | SYSTOLIC BLOOD PRESSURE: 129 MMHG | TEMPERATURE: 98 F | WEIGHT: 158 LBS | RESPIRATION RATE: 18 BRPM | DIASTOLIC BLOOD PRESSURE: 81 MMHG

## 2020-12-15 DIAGNOSIS — Z09 ENCOUNTER FOR FOLLOW-UP EXAMINATION AFTER COMPLETED TREATMENT FOR CONDITIONS OTHER THAN MALIGNANT NEOPLASM: ICD-10-CM

## 2020-12-15 PROCEDURE — 99214 OFFICE O/P EST MOD 30 MIN: CPT | Mod: 25

## 2020-12-15 PROCEDURE — 31575 DIAGNOSTIC LARYNGOSCOPY: CPT

## 2020-12-15 NOTE — REASON FOR VISIT
[Subsequent Evaluation] : a subsequent evaluation for [Spouse] : spouse [FreeTextEntry2] : L maxilla SCCa

## 2020-12-15 NOTE — PROCEDURE
[Trismus] : trismus preventing mirror examination [None] : none [Flexible Endoscope] : examined with the flexible endoscope [Serial Number: ___] : Serial Number: [unfilled] [de-identified] : No lesions in the NPx, OPx, HPx or larynx. Stable posttreatment changes, VC are mobile, airway patent.\par

## 2020-12-15 NOTE — HISTORY OF PRESENT ILLNESS
[de-identified] : 80 year old L maxillary SCCa and more recently R buccal SCCa dx in March 2020. Pt to complete Chemotherapy December 29, 2020. Pt completed RT November 2020. wound remains unchanged, was seen by Wound care center, pt doing wound care twice daily. eating foods L side of mouth.

## 2020-12-15 NOTE — PHYSICAL EXAM
[de-identified] : Posttreatment changes, no LAD. [Laryngoscopy Performed] : laryngoscopy was performed, see procedure section for findings [FreeTextEntry1] : Significant improvement in the primary disease with great decrease in size of the lesion but possible residual disease along the gingivolabial sulcus on the left maxilla vs fibrosis from treatment.  There is significant progression of the right buccal lesion with erosion through the overlying skin now and significant lymphedema along the right hemiface which is progressive. [Normal] : no rashes [de-identified] : No other LAD.

## 2020-12-22 ENCOUNTER — OUTPATIENT (OUTPATIENT)
Dept: OUTPATIENT SERVICES | Facility: HOSPITAL | Age: 80
LOS: 1 days | Discharge: HOME | End: 2020-12-22

## 2020-12-22 DIAGNOSIS — Z92.3 PERSONAL HISTORY OF IRRADIATION: Chronic | ICD-10-CM

## 2020-12-22 DIAGNOSIS — C06.9 MALIGNANT NEOPLASM OF MOUTH, UNSPECIFIED: ICD-10-CM

## 2020-12-22 DIAGNOSIS — Z92.21 PERSONAL HISTORY OF ANTINEOPLASTIC CHEMOTHERAPY: Chronic | ICD-10-CM

## 2020-12-22 DIAGNOSIS — C76.0 MALIGNANT NEOPLASM OF HEAD, FACE AND NECK: ICD-10-CM

## 2020-12-29 ENCOUNTER — APPOINTMENT (OUTPATIENT)
Dept: HEMATOLOGY ONCOLOGY | Facility: CLINIC | Age: 80
End: 2020-12-29
Payer: MEDICARE

## 2020-12-29 ENCOUNTER — APPOINTMENT (OUTPATIENT)
Dept: INFUSION THERAPY | Facility: HOSPITAL | Age: 80
End: 2020-12-29

## 2020-12-29 VITALS
HEART RATE: 65 BPM | SYSTOLIC BLOOD PRESSURE: 133 MMHG | DIASTOLIC BLOOD PRESSURE: 79 MMHG | BODY MASS INDEX: 26.81 KG/M2 | OXYGEN SATURATION: 100 % | WEIGHT: 160.92 LBS | HEIGHT: 65 IN | RESPIRATION RATE: 15 BRPM | TEMPERATURE: 97.9 F

## 2020-12-29 DIAGNOSIS — S01.502A UNSPECIFIED OPEN WOUND OF ORAL CAVITY, INITIAL ENCOUNTER: ICD-10-CM

## 2020-12-29 DIAGNOSIS — E03.9 HYPOTHYROIDISM, UNSPECIFIED: ICD-10-CM

## 2020-12-29 DIAGNOSIS — R68.2 DRY MOUTH, UNSPECIFIED: ICD-10-CM

## 2020-12-29 PROCEDURE — 99215 OFFICE O/P EST HI 40 MIN: CPT

## 2020-12-29 RX ORDER — LEVOTHYROXINE SODIUM 0.07 MG/1
75 TABLET ORAL DAILY
Qty: 90 | Refills: 1 | Status: ACTIVE | COMMUNITY
Start: 1900-01-01 | End: 1900-01-01

## 2020-12-29 NOTE — REVIEW OF SYSTEMS
[Fatigue] : fatigue [Recent Change In Weight] : ~T recent weight change [Dysphagia] : dysphagia [SOB on Exertion] : shortness of breath during exertion [Constipation] : constipation [Skin Rash] : skin rash [Skin Wound] : skin wound [Negative] : Allergic/Immunologic [Fever] : no fever [Chills] : no chills [Night Sweats] : no night sweats [Eye Pain] : no eye pain [Red Eyes] : eyes not red [Dry Eyes] : no dryness of the eyes [Vision Problems] : no vision problems [Loss of Hearing] : no loss of hearing [Nosebleeds] : no nosebleeds [Hoarseness] : no hoarseness [Odynophagia] : no odynophagia [Mucosal Pain] : no mucosal pain [Chest Pain] : no chest pain [Palpitations] : no palpitations [Leg Claudication] : no intermittent leg claudication [Lower Ext Edema] : no lower extremity edema [Shortness Of Breath] : no shortness of breath [Wheezing] : no wheezing [Cough] : no cough [Abdominal Pain] : no abdominal pain [Vomiting] : no vomiting [Diarrhea] : no diarrhea [Dysuria] : no dysuria [Incontinence] : no incontinence [Joint Pain] : no joint pain [Joint Stiffness] : no joint stiffness [Muscle Pain] : no muscle pain [Confused] : no confusion [Dizziness] : no dizziness [Fainting] : no fainting [Difficulty Walking] : no difficulty walking [Suicidal] : not suicidal [Insomnia] : no insomnia [Anxiety] : no anxiety [Depression] : no depression [Proptosis] : no proptosis [Hot Flashes] : no hot flashes [Muscle Weakness] : no muscle weakness [Deepening Of The Voice] : no deepening of the voice [Easy Bleeding] : no tendency for easy bleeding [Easy Bruising] : no tendency for easy bruising [Swollen Glands] : no swollen glands

## 2020-12-29 NOTE — ASSESSMENT
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 Patient  completed 7 cycles of carboplatin AUC 4 + paclitaxel 150 mg/m2 + pembrolizumab 200 mg IV (last given on 6/30/2020). He was transitioned to single agent Keytruda (every 3 weeks as of 8/4/2020) and completed radiation to the right cheek/buccal area (5 fractions of RT) as of 10/13/2020. Patient completed 14 cycles of Keytruda as of today. His MRI face scan from 11/23/2020 revealed the following: continued progressive soft tissue enlargement enhancement right buccinator, , parotid, premaxillary soft tissues, right lateral oral commissure, right lateral upper and lower lips, left posterior maxillary alveolus with perineural tumor involvement and small although slightly heterogeneous lymph nodes, concerning for tumor progression.\par \par Patient's current physical examination findings demonstrate disease progression. He was made aware that there are limited options to offer from a medical oncology standpoint but patient and his wife deferred from any palliative care services at this time. They are planning to travel to [place] in 3 weeks, where patient wishes to either continue treatment with his oncologist down there or spend his remaining time for comfort care (if no other treatments can be provided). They also agreed on the following: \par \par He agreed on the following: \par - Complete scheduled Keytruda today. Consideration of transitioning him to alternative agent such as oral methotrexate; written consent was obtained. Plan to discuss with Dr. Alexis and inform patient's wife after prescription has been sent.  \par - Levothyroxine 75 mcg daily was refilled to address hypothyroid dysfunction.\par - Follow up with wound care, radiation and ENT as directed/as needed. \par - Return to the office in 1 month (if patient remains in [place]); hospice care referral can be offered if patient and wife are agreeable.

## 2020-12-29 NOTE — HISTORY OF PRESENT ILLNESS
[Disease: _____________________] : Disease: [unfilled] [T: ___] : T[unfilled] [N: ___] : N[unfilled] [M: ___] : M[unfilled] [AJCC Stage: ____] : AJCC Stage: [unfilled] [Treatment Protocol] : Treatment Protocol [Therapy: ___] : Therapy: [unfilled] [Cycle: ___] : Cycle: [unfilled] [de-identified] : 80 year old male presenting to [facility] for oncologic care. He was formerly under the care of Dr. Cassia Hayes (medical oncology). Patient noted the development of a "bump" in his left cheek in the beginning of 2019. He also admitted to oral cavity pain and bleeding but denied dysphagia, hoarseness or history of trauma. As it started to grow, he sought medical help and had further work up done. Patient saw Dr. 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 \par \par Patient was evaluated by Dr. Selvin Christianson (surgery) on 6/5/2019 and ordered imaging studies. A PET/CT scan on 6/17/2019 revealed the following: Hypermetabolic enhancing left upper gingival mass corresponds to patient's known squamous cell carcinoma and hypermetabolic bilateral level IB and IIA cervical lymph nodes compatible with metastatic disease. A CT neck scan done on the same day showed a large plaque like enhancement abnormality centered along the left maxillary gingivobuccal sulcus extending into the left maxillary sinus and toward the left infraorbital canal, and left greater palatine foramen.\par \par Patient was referred to Dr. Ho Roca (ENT) on 7/1/2019 and ordered a MRI head scan the next day, which showed extensive disease extending into the ITF, possible concern along the NPx and OPx and concerning LAD bilaterally.  However, FNA of the right neck nodes are all reported negative (FNA cervical node right 6/25/19 - negative for malignancy on level 1B, 2 and 3). The consensus was for induction chemotherapy + immunotherapy to assess for response of disease followed by surgery and/or adjuvant CXRT versus concurrent CXRT versus systemic palliation based on response.\par \par Patient was treated by Dr. Campbell for systemic therapy (s/p 3 cycles, Carboplatin/Paclitaxel + Keytruda every 3 weeks from 7/23/2019 - 9/4/201) and Dr. Josy Garibay (radiation oncology) for radiation therapy 70Gy (finished in December 2019) without any major issues. Follow-up MRI on 9/8/2019 demonstrated an almost complete response of disease. Shortly after completing treatment, he flew down to his winter home in [place] in December 2019. \par \par While in [place], patient felt a bump in his mouth in March 2020 and subsequently underwent a biopsy on the R side of the mouth by Dr. Kartik Graves on 3/2/2020 and results confirmed SCCa. He also completed had a PET scan; results demonstrated 3.9 cm X 2.8 cm FDG avid mass (SUV 8.0) left side of oral cavity; no description of lymph node or metastatic disease. Patient was advised to return to [place] for further management.  [de-identified] : SCC [de-identified] : 3/2/2020: right buccal biopsy: SCCa (recurrent disease); performed by Dr. Kartik Graves [FreeTextEntry1] : s/p 7 cycles Carboplatin + Paclitaxel + Keytruda, currently on single agent Keytruda  [de-identified] : Patient presented to [facility] for his scheduled follow-up visit and treatment appointment later today. He was recently evaluated by ENT and wound care services. His right cheek/oral cavity lesion continue to enlarge involving the right side of his lip. His dry mouth and drainage also remain persistent.

## 2020-12-29 NOTE — PHYSICAL EXAM
[Ambulatory and capable of all self care but unable to carry out any work activities] : Status 2- Ambulatory and capable of all self care but unable to carry out any work activities. Up and about more than 50% of waking hours [Obese] : obese [Normal] : affect appropriate [Ulcers] : no ulcers [Mucositis] : no mucositis [Thrush] : no thrush [Vesicles] : no vesicles [de-identified] : progression of disease noted along the right buccal cavity, eroding the overlying skin and involving the right side of lips [de-identified] : bilateral cervical lymphadenopathy [de-identified] : ventral hernia, liver edge palpable

## 2020-12-30 DIAGNOSIS — Z51.11 ENCOUNTER FOR ANTINEOPLASTIC CHEMOTHERAPY: ICD-10-CM

## 2020-12-30 DIAGNOSIS — R11.2 NAUSEA WITH VOMITING, UNSPECIFIED: ICD-10-CM

## 2021-01-07 DIAGNOSIS — C06.0 MALIGNANT NEOPLASM OF CHEEK MUCOSA: ICD-10-CM

## 2021-01-07 DIAGNOSIS — C03.0 MALIGNANT NEOPLASM OF UPPER GUM: ICD-10-CM

## 2021-01-27 RX ORDER — METHOTREXATE 2.5 MG/1
2.5 TABLET ORAL
Qty: 1 | Refills: 0 | Status: ACTIVE | COMMUNITY
Start: 2021-01-07 | End: 1900-01-01

## 2021-06-10 NOTE — DISEASE MANAGEMENT
[Clinical] : TNM Stage: c [ROSE] : ROSE [TTNM] : 4a [NTNM] : 2b [MTNM] : 0 [de-identified] : 70 Gy [de-identified] : maxillary sinus Adryan Chin MD

## 2021-08-14 NOTE — REASON FOR VISIT
13-Aug-2021 21:35 [Subsequent Evaluation] : a subsequent evaluation for [FreeTextEntry2] : L maxilla SCCa

## 2023-09-29 NOTE — ASU PATIENT PROFILE, ADULT - PATIENT'S HEIGHT AND WEIGHT RECORDED IN THE VITAL SIGNS FLOWSHEET
Pt discharge instructions, follow up reviewed with pt. Pt verbalized understanding. No further needs. Pt discharged at this time.         Mansoor Parks RN  09/29/23 [id number]
yes
